# Patient Record
Sex: MALE | Race: WHITE | NOT HISPANIC OR LATINO | Employment: OTHER | URBAN - METROPOLITAN AREA
[De-identification: names, ages, dates, MRNs, and addresses within clinical notes are randomized per-mention and may not be internally consistent; named-entity substitution may affect disease eponyms.]

---

## 2017-01-26 RX ORDER — OXYMORPHONE HYDROCHLORIDE 40 MG/1
40 TABLET, FILM COATED, EXTENDED RELEASE ORAL EVERY 12 HOURS
Qty: 60 TABLET | Refills: 0 | Status: SHIPPED | OUTPATIENT
Start: 2017-01-26 | End: 2017-02-24 | Stop reason: SDUPTHER

## 2017-01-26 RX ORDER — OXYCODONE HYDROCHLORIDE 30 MG/1
30 TABLET ORAL EVERY 6 HOURS PRN
Qty: 120 TABLET | Refills: 0 | Status: SHIPPED | OUTPATIENT
Start: 2017-01-26 | End: 2017-02-24 | Stop reason: SDUPTHER

## 2017-02-23 ENCOUNTER — TELEPHONE (OUTPATIENT)
Dept: HEMATOLOGY/ONCOLOGY | Facility: CLINIC | Age: 57
End: 2017-02-23

## 2017-02-24 ENCOUNTER — LAB VISIT (OUTPATIENT)
Dept: LAB | Facility: HOSPITAL | Age: 57
End: 2017-02-24
Attending: INTERNAL MEDICINE
Payer: MEDICARE

## 2017-02-24 ENCOUNTER — OFFICE VISIT (OUTPATIENT)
Dept: HEMATOLOGY/ONCOLOGY | Facility: CLINIC | Age: 57
End: 2017-02-24
Payer: MEDICARE

## 2017-02-24 VITALS
BODY MASS INDEX: 20.53 KG/M2 | HEIGHT: 71 IN | SYSTOLIC BLOOD PRESSURE: 133 MMHG | OXYGEN SATURATION: 98 % | DIASTOLIC BLOOD PRESSURE: 80 MMHG | HEART RATE: 87 BPM | TEMPERATURE: 98 F | WEIGHT: 146.63 LBS

## 2017-02-24 DIAGNOSIS — C18.4 MALIGNANT NEOPLASM OF TRANSVERSE COLON: Primary | Chronic | ICD-10-CM

## 2017-02-24 DIAGNOSIS — J43.1 PANLOBULAR EMPHYSEMA: Chronic | ICD-10-CM

## 2017-02-24 DIAGNOSIS — Z72.0 TOBACCO ABUSE: Chronic | ICD-10-CM

## 2017-02-24 DIAGNOSIS — G89.4 CHRONIC PAIN SYNDROME: Chronic | ICD-10-CM

## 2017-02-24 DIAGNOSIS — F17.200 NEEDS SMOKING CESSATION EDUCATION: ICD-10-CM

## 2017-02-24 DIAGNOSIS — B18.2 CHRONIC HEPATITIS C WITHOUT HEPATIC COMA: Chronic | ICD-10-CM

## 2017-02-24 DIAGNOSIS — C18.4 MALIGNANT NEOPLASM OF TRANSVERSE COLON: ICD-10-CM

## 2017-02-24 DIAGNOSIS — F19.10 SUBSTANCE ABUSE: Chronic | ICD-10-CM

## 2017-02-24 LAB — CEA SERPL-MCNC: 1.4 NG/ML

## 2017-02-24 PROCEDURE — 36415 COLL VENOUS BLD VENIPUNCTURE: CPT | Mod: PO

## 2017-02-24 PROCEDURE — 99214 OFFICE O/P EST MOD 30 MIN: CPT | Mod: S$GLB,,, | Performed by: INTERNAL MEDICINE

## 2017-02-24 PROCEDURE — 82378 CARCINOEMBRYONIC ANTIGEN: CPT

## 2017-02-24 PROCEDURE — 99499 UNLISTED E&M SERVICE: CPT | Mod: S$GLB,,, | Performed by: INTERNAL MEDICINE

## 2017-02-24 PROCEDURE — 99999 PR PBB SHADOW E&M-EST. PATIENT-LVL III: CPT | Mod: PBBFAC,,, | Performed by: INTERNAL MEDICINE

## 2017-02-24 PROCEDURE — 1160F RVW MEDS BY RX/DR IN RCRD: CPT | Mod: S$GLB,,, | Performed by: INTERNAL MEDICINE

## 2017-02-24 RX ORDER — OXYCODONE HYDROCHLORIDE 30 MG/1
30 TABLET ORAL EVERY 6 HOURS PRN
Qty: 120 TABLET | Refills: 0 | Status: SHIPPED | OUTPATIENT
Start: 2017-02-24 | End: 2017-03-24 | Stop reason: SDUPTHER

## 2017-02-24 RX ORDER — OXYMORPHONE HYDROCHLORIDE 40 MG/1
40 TABLET, FILM COATED, EXTENDED RELEASE ORAL EVERY 12 HOURS
Qty: 60 TABLET | Refills: 0 | Status: SHIPPED | OUTPATIENT
Start: 2017-02-24 | End: 2017-03-24 | Stop reason: SDUPTHER

## 2017-02-24 NOTE — PROGRESS NOTES
Distress Screening Results: Psychosocial Distress screening score of Distress Score: 5 {AMB ONC DISTRESS SCORE:20276}

## 2017-02-24 NOTE — PROGRESS NOTES
Subjective:       Patient ID: Beltran Cruz is a 56 y.o. male.    Chief Complaint: Results and Colon Cancer    HPI 56-year-old male history of T T1 N0 colon carcinoma.  Follow-up heavy smoking history.    Past Medical History:   Diagnosis Date    Back pain     Cancer     Colon    Chronic hepatitis C     Chronic obstructive pulmonary disease 8/9/2016    Colon cancer 10/2016    T1 N0    Colon polyps     Colonoscopy 9/6/2016    Diverticulosis     Colonoscopy 9/6/2016    Drug overdose, intentional 03/22/2008    benzo,opiates, alcohol, asa , acetomenopjen    Hemorrhoids     Colonoscopy 9/6/2016    Screening PSA (prostate specific antigen) 8/24/2016     History reviewed. No pertinent family history.  Social History     Social History    Marital status:      Spouse name: N/A    Number of children: N/A    Years of education: N/A     Occupational History    Not on file.     Social History Main Topics    Smoking status: Current Every Day Smoker     Packs/day: 1.00     Years: 42.00     Types: Cigarettes    Smokeless tobacco: Never Used      Comment: no smoking after 12 midnight prior to surgery    Alcohol use No    Drug use: Yes     Special: IV      Comment: Opana IV    Sexual activity: Not on file     Other Topics Concern    Not on file     Social History Narrative     Past Surgical History:   Procedure Laterality Date    ANKLE SURGERY      APPENDECTOMY      BACK SURGERY      COLONOSCOPY N/A 9/6/2016    Procedure: COLONOSCOPY;  Surgeon: Jimbo Farrell MD;  Location: [facility];  Service: Endoscopy;  Laterality: N/A;    HEMICOLECTOMY  10/4/ 2016    laprascopic for colon ca    SHOULDER SURGERY         Labs:  Lab Results   Component Value Date    WBC 7.38 11/08/2016    HGB 11.7 (L) 11/08/2016    HCT 37.0 (L) 11/08/2016    MCV 85 11/08/2016     11/08/2016     BMP  Lab Results   Component Value Date     11/08/2016    K 3.8 11/08/2016     11/08/2016    CO2 27 11/08/2016     BUN 21 (H) 11/08/2016    CREATININE 0.8 11/08/2016    CREATININE 0.8 11/08/2016    CALCIUM 8.7 11/08/2016    ANIONGAP 6 (L) 11/08/2016    ESTGFRAFRICA >60.0 11/08/2016    ESTGFRAFRICA >60.0 11/08/2016    EGFRNONAA >60.0 11/08/2016    EGFRNONAA >60.0 11/08/2016     Lab Results   Component Value Date    ALT 31 11/08/2016    AST 25 11/08/2016    GGT 39 08/31/2016    ALKPHOS 69 11/08/2016    BILITOT 0.4 11/08/2016       No results found for: IRON, TIBC, FERRITIN, SATURATEDIRO  No results found for: DRYDNZBR30  No results found for: FOLATE  No results found for: TSH      Review of Systems   Constitutional: Negative for activity change, appetite change, chills, diaphoresis, fatigue, fever and unexpected weight change.   HENT: Negative for congestion, dental problem, drooling, ear discharge, ear pain, facial swelling, hearing loss, mouth sores, nosebleeds, postnasal drip, rhinorrhea, sinus pressure, sneezing, sore throat, tinnitus, trouble swallowing and voice change.    Eyes: Negative for photophobia, pain, discharge, redness, itching and visual disturbance.   Respiratory: Negative for apnea, cough, choking, chest tightness, shortness of breath, wheezing and stridor.    Cardiovascular: Negative for chest pain, palpitations and leg swelling.   Gastrointestinal: Negative for abdominal distention, abdominal pain, anal bleeding, blood in stool, constipation, diarrhea, nausea, rectal pain and vomiting.   Endocrine: Negative for cold intolerance, heat intolerance, polydipsia, polyphagia and polyuria.   Genitourinary: Negative for decreased urine volume, difficulty urinating, discharge, dysuria, enuresis, flank pain, frequency, genital sores, hematuria, penile pain, penile swelling, scrotal swelling, testicular pain and urgency.   Musculoskeletal: Negative for arthralgias, back pain, gait problem, joint swelling, myalgias, neck pain and neck stiffness.   Skin: Negative for color change, pallor, rash and wound.    Allergic/Immunologic: Negative for environmental allergies, food allergies and immunocompromised state.   Neurological: Negative for dizziness, tremors, seizures, syncope, facial asymmetry, speech difficulty, weakness, light-headedness, numbness and headaches.   Hematological: Negative for adenopathy. Does not bruise/bleed easily.   Psychiatric/Behavioral: Negative for agitation, behavioral problems, confusion, decreased concentration, dysphoric mood, hallucinations, self-injury, sleep disturbance and suicidal ideas. The patient is nervous/anxious. The patient is not hyperactive.        Objective:      Physical Exam   Constitutional: He is oriented to person, place, and time. He appears well-developed and well-nourished. No distress.   HENT:   Head: Normocephalic.   Right Ear: External ear normal.   Left Ear: External ear normal.   Nose: Nose normal. Right sinus exhibits no maxillary sinus tenderness and no frontal sinus tenderness. Left sinus exhibits no maxillary sinus tenderness and no frontal sinus tenderness.   Mouth/Throat: Oropharynx is clear and moist. No oropharyngeal exudate.   Eyes: EOM and lids are normal. Pupils are equal, round, and reactive to light. Right eye exhibits no discharge. Left eye exhibits no discharge. Right conjunctiva is not injected. Right conjunctiva has no hemorrhage. Left conjunctiva is not injected. Left conjunctiva has no hemorrhage. No scleral icterus. Right eye exhibits normal extraocular motion. Left eye exhibits normal extraocular motion.   Neck: Normal range of motion. Neck supple. No JVD present. No tracheal deviation present. No thyromegaly present.   Cardiovascular: Normal rate and regular rhythm.    Pulmonary/Chest: Effort normal. No stridor. No respiratory distress.   Abdominal: Soft. He exhibits no mass. There is no hepatosplenomegaly, splenomegaly or hepatomegaly. There is no tenderness.   Musculoskeletal: Normal range of motion. He exhibits no edema or tenderness.    Lymphadenopathy:        Head (right side): No posterior auricular and no occipital adenopathy present.        Head (left side): No posterior auricular and no occipital adenopathy present.     He has no cervical adenopathy.        Right cervical: No superficial cervical, no deep cervical and no posterior cervical adenopathy present.       Left cervical: No superficial cervical, no deep cervical and no posterior cervical adenopathy present.     He has no axillary adenopathy.        Right: No supraclavicular adenopathy present.        Left: No supraclavicular adenopathy present.   Neurological: He is alert and oriented to person, place, and time. He has normal strength. No cranial nerve deficit. Coordination normal.   Skin: Skin is dry. No rash noted. He is not diaphoretic. No cyanosis or erythema. Nails show no clubbing.   Psychiatric: He has a normal mood and affect. His behavior is normal. Judgment and thought content normal. Cognition and memory are normal.   Vitals reviewed.          Assessment:       1. Malignant neoplasm of transverse colon    2. Panlobular emphysema    3. Chronic hepatitis C without hepatic coma    4. Tobacco abuse    5. Needs smoking cessation education    6. Substance abuse    7. Chronic pain syndrome            Plan:         patient will have baseline CEA today and again in 3-4 months reviewed data from up-to-date on stage I disease demonstrated some efficacy in terms of follow-up testing will recommend full-length colonoscopy at one-year anniversary also will repeat CT chest in November 2017 heavy smoking history greater than 40-pack-year history strongly encouraged to quit smoking

## 2017-03-24 ENCOUNTER — NURSE TRIAGE (OUTPATIENT)
Dept: ADMINISTRATIVE | Facility: CLINIC | Age: 57
End: 2017-03-24

## 2017-03-24 ENCOUNTER — TELEPHONE (OUTPATIENT)
Dept: INTERNAL MEDICINE | Facility: CLINIC | Age: 57
End: 2017-03-24

## 2017-03-24 ENCOUNTER — TELEPHONE (OUTPATIENT)
Dept: ADMINISTRATIVE | Facility: CLINIC | Age: 57
End: 2017-03-24

## 2017-03-24 RX ORDER — OXYMORPHONE HYDROCHLORIDE 40 MG/1
40 TABLET, FILM COATED, EXTENDED RELEASE ORAL EVERY 12 HOURS
Qty: 60 TABLET | Refills: 0 | Status: SHIPPED | OUTPATIENT
Start: 2017-03-24 | End: 2017-03-24

## 2017-03-24 RX ORDER — OXYCODONE HYDROCHLORIDE 30 MG/1
30 TABLET ORAL EVERY 6 HOURS PRN
Qty: 120 TABLET | Refills: 0 | Status: SHIPPED | OUTPATIENT
Start: 2017-03-24 | End: 2017-04-21 | Stop reason: SDUPTHER

## 2017-03-24 RX ORDER — OXYMORPHONE HYDROCHLORIDE 40 MG/1
40 TABLET, FILM COATED, EXTENDED RELEASE ORAL EVERY 12 HOURS
Qty: 60 TABLET | Refills: 0 | Status: CANCELLED | OUTPATIENT
Start: 2017-03-24

## 2017-03-24 NOTE — TELEPHONE ENCOUNTER
----- Message from Maribel Floyd sent at 3/24/2017 10:16 AM CDT -----  Contact: pt   Pt needs a refill on oxycodine, and oxymphine,,, pt couldn't spell or pronounce name,, but it's two medication,,, pharmacy is the same,,, please call pt when done

## 2017-03-24 NOTE — TELEPHONE ENCOUNTER
----- Message from Opal Park sent at 3/24/2017  2:46 PM CDT -----  Contact: self [phone]  States that pharm sent over a request for a refill on oxymorphin. Please call back at [phone]//thank you acc

## 2017-03-24 NOTE — TELEPHONE ENCOUNTER
Reason for Disposition   Caller has already spoken with another triager and has no further questions.    Protocols used: ST NO CONTACT OR DUPLICATE CONTACT CALL-A-AH  duplicate message    Angie Tse RN     Spoke w/ pt & pharmacis--problem w/ oxyMORphone ER----prescription needs to not have OBANA ER on it anywhere/

## 2017-03-25 ENCOUNTER — NURSE TRIAGE (OUTPATIENT)
Dept: ADMINISTRATIVE | Facility: CLINIC | Age: 57
End: 2017-03-25

## 2017-03-25 ENCOUNTER — TELEPHONE (OUTPATIENT)
Dept: URGENT CARE | Facility: CLINIC | Age: 57
End: 2017-03-25

## 2017-03-25 NOTE — TELEPHONE ENCOUNTER
Reviewed w/ Dr Baldwin--he will try to escribe it that way but not sure if system will allow it to not have Obana on it    Pt notified that MD is working on it    Angie Tse RN  715pm--Dr Baldwin sent prescription to [facility] as pt and I had discussed/ after med escribed caller states she had called [facility] and med not in stock/ is asking if prescription has been sent and if not could it be sent to [facility]    Advised caller that med has been escribed and she will have to call clinic in AM for it to be sent elsewhere    Angie Tse RN

## 2017-03-25 NOTE — TELEPHONE ENCOUNTER
Spoke w pharm: oxymorp not avail at [facility] til Monday so cancelling this rx and sent a  New one to [facility]

## 2017-03-25 NOTE — TELEPHONE ENCOUNTER
Spoke with Dr. Baldwin provided pharmacy listed and alterative contact information states he will call pharmacy to attempt to transfer prescription.

## 2017-03-25 NOTE — TELEPHONE ENCOUNTER
Called patient per request of Dr. Baldwin, to inform him of his medicine being sent to the [facility] in [place] off of [address] as requested due to the [facility] in [place] not having the medicine available for him. No further questions at this time.

## 2017-03-25 NOTE — TELEPHONE ENCOUNTER
Called by [facility] on call/pharm insur will not cover opana er but they will cover oxymorph er; rx cannot have opana on it even if in parentheses(?!). erxd

## 2017-03-25 NOTE — TELEPHONE ENCOUNTER
Reason for Disposition   [1] Pain radiates into the thigh or further down the leg AND [2] both legs    Protocols used: ST BACK PAIN-A-AH  On-call provider paged for notification/advisement.

## 2017-03-27 NOTE — TELEPHONE ENCOUNTER
----- Message from Arelis Martinez sent at 3/27/2017  1:42 PM CDT -----  Contact: Yahaira/jean pierre  States he's calling regarding his medicine, he's been trying to get this straight since Friday. States he is hurting and he's out of medicine. Please call Yahaira at [phone]. Thank you

## 2017-03-27 NOTE — TELEPHONE ENCOUNTER
----- Message from Alyse Bourgeois sent at 3/27/2017 11:51 AM CDT -----  Contact: Patient   Refill request for Rx Oxymorphone to be sent to, Please call him at [phone].      [facility] [place] [place]  [address] [address] [address]  Phone: [phone] Fax: [phone]    Thanks  Td

## 2017-03-27 NOTE — TELEPHONE ENCOUNTER
Spoke with pt letting him know that I talked to Dr Lopez and he is sending in his Oxymorphone 40 mg  to [facility] and it is ready to be picked up. chio verbalized understanding

## 2017-03-27 NOTE — TELEPHONE ENCOUNTER
----- Message from Jonna Ariel sent at 3/27/2017  9:46 AM CDT -----  Patient calling to speak to nurse regarding Oxymorphone. States his pharmacy is stating they have not received his medication and he is completely out. States wants to know if he can come in the office and  the Rx. States it cannot have Opana on the Rx.    States requesting a call back as soon as possible.     Please adv/call [phone] and speak to nurse Yahaira.//thanks. terrence

## 2017-04-21 RX ORDER — OXYCODONE HYDROCHLORIDE 30 MG/1
30 TABLET ORAL EVERY 6 HOURS PRN
Qty: 120 TABLET | Refills: 0 | Status: SHIPPED | OUTPATIENT
Start: 2017-04-21 | End: 2017-05-22 | Stop reason: SDUPTHER

## 2017-04-21 NOTE — TELEPHONE ENCOUNTER
----- Message from Dayanara Lyon sent at 4/21/2017  9:33 AM CDT -----  Contact: pt  Pt state need a refill,pt will be out tomorrow.Rx-oxymorphone and oxycodone...[phone](please notify when sent)        .  [facility] [place] - [address] [address]  [place] [address]  Phone: [phone] Fax: [phone]

## 2017-05-19 RX ORDER — OXYCODONE HYDROCHLORIDE 30 MG/1
30 TABLET ORAL EVERY 6 HOURS PRN
Qty: 120 TABLET | Refills: 0 | OUTPATIENT
Start: 2017-05-19

## 2017-05-19 NOTE — TELEPHONE ENCOUNTER
----- Message from Judy Ribeiro sent at 5/19/2017  3:48 PM CDT -----  Contact: Pt  Pt is requesting to speak to the nurse regarding his medication. Pls call pt back at [phone].

## 2017-05-19 NOTE — TELEPHONE ENCOUNTER
Patient informed that he needs an appointment before medication can be refill. Scheduled appointment for Monday.

## 2017-05-19 NOTE — TELEPHONE ENCOUNTER
----- Message from Maribel Floyd sent at 5/19/2017 10:37 AM CDT -----  Contact: pt   Pt needs refills on oxycodine and oxymophine,, [facility],,, please call pt back at [phone]

## 2017-05-22 ENCOUNTER — OFFICE VISIT (OUTPATIENT)
Dept: GASTROENTEROLOGY | Facility: CLINIC | Age: 57
End: 2017-05-22
Payer: MEDICARE

## 2017-05-22 ENCOUNTER — OFFICE VISIT (OUTPATIENT)
Dept: INTERNAL MEDICINE | Facility: CLINIC | Age: 57
End: 2017-05-22
Payer: MEDICARE

## 2017-05-22 ENCOUNTER — HOSPITAL ENCOUNTER (OUTPATIENT)
Dept: RADIOLOGY | Facility: HOSPITAL | Age: 57
Discharge: HOME | End: 2017-05-22
Attending: FAMILY MEDICINE
Payer: MEDICARE

## 2017-05-22 VITALS
BODY MASS INDEX: 21.39 KG/M2 | WEIGHT: 152.75 LBS | HEART RATE: 79 BPM | DIASTOLIC BLOOD PRESSURE: 68 MMHG | SYSTOLIC BLOOD PRESSURE: 120 MMHG | HEIGHT: 71 IN

## 2017-05-22 VITALS
BODY MASS INDEX: 21.36 KG/M2 | TEMPERATURE: 95 F | HEART RATE: 69 BPM | HEIGHT: 71 IN | DIASTOLIC BLOOD PRESSURE: 60 MMHG | SYSTOLIC BLOOD PRESSURE: 118 MMHG | WEIGHT: 152.56 LBS

## 2017-05-22 DIAGNOSIS — M54.40 CHRONIC BILATERAL LOW BACK PAIN WITH SCIATICA, SCIATICA LATERALITY UNSPECIFIED: ICD-10-CM

## 2017-05-22 DIAGNOSIS — Z85.038 HISTORY OF COLON CANCER: ICD-10-CM

## 2017-05-22 DIAGNOSIS — G89.29 CHRONIC BILATERAL LOW BACK PAIN WITH SCIATICA, SCIATICA LATERALITY UNSPECIFIED: ICD-10-CM

## 2017-05-22 DIAGNOSIS — M50.30 DDD (DEGENERATIVE DISC DISEASE), CERVICAL: ICD-10-CM

## 2017-05-22 DIAGNOSIS — B18.2 CHRONIC HEPATITIS C WITHOUT HEPATIC COMA: Primary | ICD-10-CM

## 2017-05-22 DIAGNOSIS — Z00.00 ANNUAL PHYSICAL EXAM: Primary | ICD-10-CM

## 2017-05-22 DIAGNOSIS — K21.9 GASTROESOPHAGEAL REFLUX DISEASE WITHOUT ESOPHAGITIS: ICD-10-CM

## 2017-05-22 PROCEDURE — 99999 PR PBB SHADOW E&M-EST. PATIENT-LVL III: CPT | Mod: PBBFAC,,, | Performed by: PHYSICIAN ASSISTANT

## 2017-05-22 PROCEDURE — 99396 PREV VISIT EST AGE 40-64: CPT | Mod: S$GLB,,, | Performed by: FAMILY MEDICINE

## 2017-05-22 PROCEDURE — 72040 X-RAY EXAM NECK SPINE 2-3 VW: CPT | Mod: 26,,, | Performed by: RADIOLOGY

## 2017-05-22 PROCEDURE — 99499 UNLISTED E&M SERVICE: CPT | Mod: S$GLB,,, | Performed by: FAMILY MEDICINE

## 2017-05-22 PROCEDURE — 99214 OFFICE O/P EST MOD 30 MIN: CPT | Mod: S$GLB,,, | Performed by: PHYSICIAN ASSISTANT

## 2017-05-22 PROCEDURE — 72114 X-RAY EXAM L-S SPINE BENDING: CPT | Mod: 26,,, | Performed by: RADIOLOGY

## 2017-05-22 PROCEDURE — 1160F RVW MEDS BY RX/DR IN RCRD: CPT | Mod: S$GLB,,, | Performed by: PHYSICIAN ASSISTANT

## 2017-05-22 PROCEDURE — 99999 PR PBB SHADOW E&M-EST. PATIENT-LVL III: CPT | Mod: PBBFAC,,, | Performed by: FAMILY MEDICINE

## 2017-05-22 PROCEDURE — 72040 X-RAY EXAM NECK SPINE 2-3 VW: CPT | Mod: TC,PO

## 2017-05-22 PROCEDURE — 72114 X-RAY EXAM L-S SPINE BENDING: CPT | Mod: TC,PO

## 2017-05-22 PROCEDURE — 99499 UNLISTED E&M SERVICE: CPT | Mod: S$GLB,,, | Performed by: PHYSICIAN ASSISTANT

## 2017-05-22 RX ORDER — SODIUM, POTASSIUM,MAG SULFATES 17.5-3.13G
SOLUTION, RECONSTITUTED, ORAL ORAL
Qty: 354 ML | Refills: 0 | Status: SHIPPED | OUTPATIENT
Start: 2017-05-22 | End: 2017-07-10 | Stop reason: HOSPADM

## 2017-05-22 RX ORDER — OXYCODONE HYDROCHLORIDE 30 MG/1
30 TABLET ORAL EVERY 6 HOURS PRN
Qty: 120 TABLET | Refills: 0 | Status: SHIPPED | OUTPATIENT
Start: 2017-05-22 | End: 2017-06-20 | Stop reason: SDUPTHER

## 2017-05-22 NOTE — PROGRESS NOTES
Subjective:       Patient ID: Beltran Cruz is a 56 y.o. male.    Chief Complaint: Follow-up    HPI   The patient has a history of Hepatitis C and is here today for a follow up visit. The patient was last seen by Dr. Werner six months ago; he is new to me. The patient's genotype was 1a and viral load 63,000. He was treated with Harvoni for 12 weeks. He only took about about five days of it and stopped it because he was having nausea. He tried to come back in to discuss but lost his transportation.     The patient was also diagnosed with colon cancer. He had a colonoscopy in September and was found to have a malignant tumor at the splenic flexure. The patient was treated with left hemicolectomy. After his colonoscopy, a six month repeat was recommended. He has occasional abdominal pain. He attributes it to pain medications. He denies nausea or vomiting at this time. He reports heartburn especially at night. He has had Protonix, but isn't taken it. He says people take his medications. He has tried Zantac with some relief.     Review of Systems    As per HPI.     Objective:      Physical Exam   Constitutional: He is oriented to person, place, and time. He appears well-developed and well-nourished.   HENT:   Head: Normocephalic and atraumatic.   Cardiovascular: Normal rate and regular rhythm.    Pulmonary/Chest: Effort normal and breath sounds normal.   Abdominal: Soft. Bowel sounds are normal. He exhibits no distension. There is no tenderness.   Neurological: He is alert and oriented to person, place, and time.   Psychiatric: His behavior is normal.       Assessment:       1. Chronic hepatitis C without hepatic coma    2. History of colon cancer    3. Gastroesophageal reflux disease without esophagitis        Plan:       History of colon cancer -  Colonoscopy - I have explained the risks, benefits, and alternatives of the procedure(s) in detail. The patient voices understanding and all questions have been answered.  The patient agrees to proceed as planned.     HCV - We will need to talk with the pharmacy and see if he can get refills. He was told not to start the Harvoni at home until we can confirm refills. He has no evidence of advanced liver disease.     GERD - Recommend taking Protonix as prescribed or taking OTC Zantac 150 mg at bedtime.       Thank you for the opportunity to participate in the care of this patient. This consult was designated to me by my supervising physician. A report will be sent to the referring provider.   Jones Almeida PA-C.

## 2017-05-24 ENCOUNTER — TELEPHONE (OUTPATIENT)
Dept: GASTROENTEROLOGY | Facility: CLINIC | Age: 57
End: 2017-05-24

## 2017-05-24 RX ORDER — LEDIPASVIR AND SOFOSBUVIR 90; 400 MG/1; MG/1
1 TABLET, FILM COATED ORAL DAILY
Qty: 28 TABLET | Refills: 2 | Status: SHIPPED | OUTPATIENT
Start: 2017-05-24 | End: 2017-06-20 | Stop reason: SDUPTHER

## 2017-05-24 NOTE — TELEPHONE ENCOUNTER
Pharmacy said patient would need a new prescription for Harvoni. This will be sent to [facility].

## 2017-05-25 ENCOUNTER — TELEPHONE (OUTPATIENT)
Dept: PHARMACY | Facility: CLINIC | Age: 57
End: 2017-05-25

## 2017-05-29 NOTE — TELEPHONE ENCOUNTER
FOR DOCUMENTATION ONLY:  Su prior authorization approved x 12 weeks.  5/26/17 through 8/18/17  Case ID: [id number]  $8.25 co pay

## 2017-05-30 PROBLEM — Z85.038 HISTORY OF COLON CANCER: Status: ACTIVE | Noted: 2017-05-30

## 2017-05-30 PROBLEM — Z85.038 HISTORY OF COLON CANCER: Chronic | Status: ACTIVE | Noted: 2017-05-30

## 2017-05-31 NOTE — TELEPHONE ENCOUNTER
Reach out to patient regarding new specialty medication for Harvoni prior authroization approved x 12 weeks (patient have an opened bottle of Harvoni but took 5 tablets by in 2016 but d/c due to nausea per provider) and copay $8.25. Inform patient to look at the bottle of Harvoni and see when the medication  along with the exact count of medication. Patient scheduled for a phone consultation on  @2pm and ship medication out on  address confirmed and to send an invoice along with the medication. Patient voiced understanding.

## 2017-06-02 NOTE — TELEPHONE ENCOUNTER
Initial Harvoni 90/400mg consult completed on 17. Harvoni 90/400mg will be shipped on 17 to arrive at patient's home on 17 via [employer]. $8.25 copay - INVOICE. Patient will start Harvoni 90/400mg on 17. Address confirmed, signature requirement requested. Confirmed 2 patient identifiers - name and . Therapy Appropriate.  **Previously started harvoni 12/2016 x 5 days and stopped due to nausea. He thinks it was b/c he was so anxious. He will see PCP to f/u on anxiety. He has 1 bottle of Harvoni home with 21 remaining tablets that are good til the end of the year. Instructed to keep these in case provider would like him to finish them at the end of current treatment.    Harvoni- Take one tablet by mouth daily x 12 weeks  Counseling was reviewed:   1. Patient MUST take Harvoni at the SAME time every day.   2. Patient MUST avoid acid reducers without consulting with myself or provider first. Antacids are to be spaced out at least 4 hours apart from Harvoni. Patient takes protonix 40mg PRN - advised to stop Protonix and he agreed to using Tums/Rolaids PRN, making sure to space out from Harvoni at least 4 hours apart.   3. Side effects include headaches and fatigue.   Headache: Patient may treat with OTC remedies. If Tylenol is used, dose should  not exceed 2000mg per day.    DDI: Medication list reviewed and potential DDIs addressed. No DDIs or allergies noted. Patient MUST contact myself or provider prior to starting any new OTC, herbal, or prescription drugs to avoid potential DDIs.    He complains of fatigue, sporadic abdominal tenderness, muscle aches and pains. He suffers from anxiety and is pretty anxious about starting treatment. Assured him that it should be fine as long as he takes his medication correctly.    Discussed the importance of staying well hydrated while on therapy. Compliance stressed - patient to take missed doses as soon as remembered, but NOT to take 2 doses in one day. Patient will  report questions or concerns to myself or practitioner. Patient verbalizes understanding. Patient plans to start Harvoni on 6/7/17. Consultation included: indication; goals of treatment; administration; storage and handling; side effects; how to handle side effects; the importance of compliance; how to handle missed doses; the importance of laboratory monitoring; the importance of keeping all follow up appointments.  Patient understands to report any medication changes to [facility] and provider. All questions answered and addressed to patients satisfaction.  I will f/u with patient in 1 week from start, and [facility] will contact patient in 3 weeks to coordinate next refill.

## 2017-06-20 ENCOUNTER — TELEPHONE (OUTPATIENT)
Dept: INTERNAL MEDICINE | Facility: CLINIC | Age: 57
End: 2017-06-20

## 2017-06-20 NOTE — TELEPHONE ENCOUNTER
----- Message from Claudia Perdomo sent at 6/20/2017  1:26 PM CDT -----  Contact: Pt   Pt calling in regards to his medication would like a call back to his neice Yahaira Cruz to elaborate...[phone]

## 2017-06-20 NOTE — TELEPHONE ENCOUNTER
----- Message from Abby Jules sent at 6/20/2017 10:24 AM CDT -----  Contact: [phone]  Patient would like to consult with nurse regarding status of authorization for refill on pain medication. Please call back at [phone].    Thanks,  Abby Jules

## 2017-06-20 NOTE — TELEPHONE ENCOUNTER
----- Message from Abby Jules sent at 6/20/2017 10:24 AM CDT -----  Contact: [phone]  Patient would like to consult with nurse regarding status of authorization for refill on pain medication. Please call back at [phone].    Thanks,  Abby Jules

## 2017-06-21 RX ORDER — PANTOPRAZOLE SODIUM 40 MG/1
40 TABLET, DELAYED RELEASE ORAL DAILY
Qty: 30 TABLET | Refills: 11 | Status: SHIPPED | OUTPATIENT
Start: 2017-06-21 | End: 2019-05-13

## 2017-06-21 RX ORDER — LEDIPASVIR AND SOFOSBUVIR 90; 400 MG/1; MG/1
1 TABLET, FILM COATED ORAL DAILY
Qty: 28 TABLET | Refills: 2 | Status: SHIPPED | OUTPATIENT
Start: 2017-06-21 | End: 2019-05-13

## 2017-06-21 RX ORDER — OXYCODONE HYDROCHLORIDE 30 MG/1
30 TABLET ORAL EVERY 6 HOURS PRN
Qty: 120 TABLET | Refills: 0 | Status: SHIPPED | OUTPATIENT
Start: 2017-06-21 | End: 2017-07-17 | Stop reason: SDUPTHER

## 2017-06-21 NOTE — TELEPHONE ENCOUNTER
----- Message from Shawn Ferreira sent at 6/21/2017  5:28 PM CDT -----  Contact: Patient  Patient called in stating that the Doctor's office haven't sent in his prescription in yet and he's been trying to get in contact with someone in the office but no response. He was suppose to leave town yesterday but he couldn't because he didn't have his medication. Please call Patient ASAP [phone].

## 2017-06-21 NOTE — TELEPHONE ENCOUNTER
----- Message from Miriam Baptiste sent at 6/21/2017 11:10 AM CDT -----  Contact: Patient's jean pierre Yahaira Syed is returning a call, please call her back at [phone]. Thank you

## 2017-06-21 NOTE — TELEPHONE ENCOUNTER
----- Message from Nunu George sent at 6/21/2017 10:03 AM CDT -----  Contact: pt  The pt request a call concerning a medication refill, pt can be reached at [phone] ///[id number]

## 2017-06-21 NOTE — TELEPHONE ENCOUNTER
Spoke with both patient and his niece on yesterday and today.  Called both the patient and his niece to inform them that prescription was ready for .  No answer on either phone.

## 2017-06-21 NOTE — TELEPHONE ENCOUNTER
----- Message from Abby Jules sent at 6/21/2017  3:47 PM CDT -----  Contact: miguel-fiona [phone]  Would like to consult with nurse regarding remainder RX's for the patient. Please call back at [phone].    Pt uses:  [facility] [place] - [address] [address] [address]  Phone: [phone] Fax: [phone]    Thanks,  Abby Jules

## 2017-06-21 NOTE — TELEPHONE ENCOUNTER
----- Message from Oliva Wolf sent at 6/21/2017  8:57 AM CDT -----  Patient states that he called [facility] [phone] this morning and they did not receive a refill request from this doctor.  He wants to know if you could call it in this morning.  Call him at [phone].                                               porras

## 2017-06-21 NOTE — TELEPHONE ENCOUNTER
Called patient's niece again.  Was able to make contact.  She states that patient's phone battery .  They will come in the morning for medication

## 2017-06-22 ENCOUNTER — TELEPHONE (OUTPATIENT)
Dept: INTERNAL MEDICINE | Facility: CLINIC | Age: 57
End: 2017-06-22

## 2017-06-22 NOTE — TELEPHONE ENCOUNTER
----- Message from Krystina Muñoz sent at 6/22/2017  2:42 PM CDT -----  Contact: Yahaira/jean pierre  Please call Yahaira @ [phone] regarding pt medication, states one script was missing, pt pain medicaiton, states pt need a hard copy of script.

## 2017-06-26 ENCOUNTER — TELEPHONE (OUTPATIENT)
Dept: PHARMACY | Facility: CLINIC | Age: 57
End: 2017-06-26

## 2017-06-26 NOTE — TELEPHONE ENCOUNTER
Su refill completed. Copay$0 (004). Shipping 6/28/17 Signature Required. address confirmed.  F/U: No new questions or concerns. Stated he started about a week later then he wanted to, but has not missed a dose at all since starting. When first started had a mild sickness and thought it may be due to the medication. Continued to take medication through and it cleared itself and he feels much better at this time.

## 2017-07-10 ENCOUNTER — OFFICE VISIT (OUTPATIENT)
Dept: HEMATOLOGY/ONCOLOGY | Facility: CLINIC | Age: 57
End: 2017-07-10
Payer: MEDICARE

## 2017-07-10 ENCOUNTER — OFFICE VISIT (OUTPATIENT)
Dept: GASTROENTEROLOGY | Facility: CLINIC | Age: 57
End: 2017-07-10
Payer: MEDICARE

## 2017-07-10 ENCOUNTER — LAB VISIT (OUTPATIENT)
Dept: LAB | Facility: HOSPITAL | Age: 57
End: 2017-07-10
Attending: INTERNAL MEDICINE
Payer: MEDICARE

## 2017-07-10 VITALS
TEMPERATURE: 98 F | DIASTOLIC BLOOD PRESSURE: 75 MMHG | BODY MASS INDEX: 21.96 KG/M2 | OXYGEN SATURATION: 96 % | HEART RATE: 83 BPM | WEIGHT: 156.88 LBS | HEIGHT: 71 IN | SYSTOLIC BLOOD PRESSURE: 120 MMHG

## 2017-07-10 VITALS
WEIGHT: 154.31 LBS | BODY MASS INDEX: 21.6 KG/M2 | HEIGHT: 71 IN | HEART RATE: 79 BPM | SYSTOLIC BLOOD PRESSURE: 134 MMHG | DIASTOLIC BLOOD PRESSURE: 74 MMHG

## 2017-07-10 DIAGNOSIS — B18.2 CHRONIC HEPATITIS C WITHOUT HEPATIC COMA: Primary | ICD-10-CM

## 2017-07-10 DIAGNOSIS — Z85.038 HISTORY OF COLON CANCER: Chronic | ICD-10-CM

## 2017-07-10 DIAGNOSIS — T40.601A NARCOTIC BOWEL SYNDROME: ICD-10-CM

## 2017-07-10 DIAGNOSIS — J44.9 CHRONIC OBSTRUCTIVE PULMONARY DISEASE, UNSPECIFIED COPD TYPE: ICD-10-CM

## 2017-07-10 DIAGNOSIS — B18.2 CHRONIC HEPATITIS C WITHOUT HEPATIC COMA: ICD-10-CM

## 2017-07-10 DIAGNOSIS — C18.4 MALIGNANT NEOPLASM OF TRANSVERSE COLON: Primary | Chronic | ICD-10-CM

## 2017-07-10 DIAGNOSIS — Z85.038 HISTORY OF COLON CANCER: ICD-10-CM

## 2017-07-10 DIAGNOSIS — B18.2 CHRONIC HEPATITIS C WITHOUT HEPATIC COMA: Chronic | ICD-10-CM

## 2017-07-10 DIAGNOSIS — K21.9 GASTROESOPHAGEAL REFLUX DISEASE WITHOUT ESOPHAGITIS: ICD-10-CM

## 2017-07-10 DIAGNOSIS — C18.4 MALIGNANT NEOPLASM OF TRANSVERSE COLON: Chronic | ICD-10-CM

## 2017-07-10 DIAGNOSIS — K63.89 NARCOTIC BOWEL SYNDROME: ICD-10-CM

## 2017-07-10 LAB
ALBUMIN SERPL BCP-MCNC: 3.5 G/DL
ALP SERPL-CCNC: 84 U/L
ALT SERPL W/O P-5'-P-CCNC: 12 U/L
ANION GAP SERPL CALC-SCNC: 7 MMOL/L
AST SERPL-CCNC: 15 U/L
BILIRUB DIRECT SERPL-MCNC: 0.2 MG/DL
BILIRUB SERPL-MCNC: 0.6 MG/DL
BUN SERPL-MCNC: 26 MG/DL
CALCIUM SERPL-MCNC: 8.8 MG/DL
CEA SERPL-MCNC: 1.6 NG/ML
CHLORIDE SERPL-SCNC: 109 MMOL/L
CO2 SERPL-SCNC: 26 MMOL/L
CREAT SERPL-MCNC: 1 MG/DL
EST. GFR  (AFRICAN AMERICAN): >60 ML/MIN/1.73 M^2
EST. GFR  (NON AFRICAN AMERICAN): >60 ML/MIN/1.73 M^2
GLUCOSE SERPL-MCNC: 70 MG/DL
POTASSIUM SERPL-SCNC: 4.4 MMOL/L
PROT SERPL-MCNC: 8.3 G/DL
SODIUM SERPL-SCNC: 142 MMOL/L

## 2017-07-10 PROCEDURE — 99213 OFFICE O/P EST LOW 20 MIN: CPT | Mod: S$GLB,,, | Performed by: PHYSICIAN ASSISTANT

## 2017-07-10 PROCEDURE — 99999 PR PBB SHADOW E&M-EST. PATIENT-LVL III: CPT | Mod: PBBFAC,,, | Performed by: PHYSICIAN ASSISTANT

## 2017-07-10 PROCEDURE — 99499 UNLISTED E&M SERVICE: CPT | Mod: S$GLB,,, | Performed by: INTERNAL MEDICINE

## 2017-07-10 PROCEDURE — 99999 PR PBB SHADOW E&M-EST. PATIENT-LVL III: CPT | Mod: PBBFAC,,, | Performed by: INTERNAL MEDICINE

## 2017-07-10 PROCEDURE — 99214 OFFICE O/P EST MOD 30 MIN: CPT | Mod: S$GLB,,, | Performed by: INTERNAL MEDICINE

## 2017-07-10 PROCEDURE — 99499 UNLISTED E&M SERVICE: CPT | Mod: S$GLB,,, | Performed by: PHYSICIAN ASSISTANT

## 2017-07-10 NOTE — PROGRESS NOTES
Subjective:       Patient ID: Beltran Cruz is a 56 y.o. male.    Chief Complaint: Results and Colon Cancer    HPI 56-year-old male history of T1 N0 colon carcinoma continues to actively smoke patient is also being treated for hepatitis C with Harvoni through GI medicine patient returns for follow-up concerned about possibility of underlying malignancy    Past Medical History:   Diagnosis Date    Back pain     Cancer     Colon    Chronic hepatitis C     Chronic obstructive pulmonary disease 8/9/2016    Colon cancer 10/2016    T1 N0    Colon polyps     Colonoscopy 9/6/2016    Diverticulosis     Colonoscopy 9/6/2016    Drug overdose, intentional 03/22/2008    benzo,opiates, alcohol, asa , acetomenopjen    Hemorrhoids     Colonoscopy 9/6/2016    Screening PSA (prostate specific antigen) 8/24/2016     History reviewed. No pertinent family history.  Social History     Social History    Marital status:      Spouse name: N/A    Number of children: N/A    Years of education: N/A     Occupational History    Not on file.     Social History Main Topics    Smoking status: Current Every Day Smoker     Packs/day: 1.00     Years: 42.00     Types: Cigarettes    Smokeless tobacco: Current User      Comment: no smoking after 12 midnight prior to surgery    Alcohol use Yes    Drug use:      Types: IV      Comment: Opana IV    Sexual activity: Not on file     Other Topics Concern    Not on file     Social History Narrative    No narrative on file     Past Surgical History:   Procedure Laterality Date    ANKLE SURGERY      APPENDECTOMY      BACK SURGERY      COLONOSCOPY N/A 9/6/2016    Procedure: COLONOSCOPY;  Surgeon: Jimbo Farrell MD;  Location: [facility];  Service: Endoscopy;  Laterality: N/A;    HEMICOLECTOMY  10/4/ 2016    laprascopic for colon ca    SHOULDER SURGERY         Labs:  Lab Results   Component Value Date    WBC 7.38 11/08/2016    HGB 11.7 (L) 11/08/2016    HCT 37.0 (L)  11/08/2016    MCV 85 11/08/2016     11/08/2016     BMP  Lab Results   Component Value Date     11/08/2016    K 3.8 11/08/2016     11/08/2016    CO2 27 11/08/2016    BUN 21 (H) 11/08/2016    CREATININE 0.8 11/08/2016    CREATININE 0.8 11/08/2016    CALCIUM 8.7 11/08/2016    ANIONGAP 6 (L) 11/08/2016    ESTGFRAFRICA >60.0 11/08/2016    ESTGFRAFRICA >60.0 11/08/2016    EGFRNONAA >60.0 11/08/2016    EGFRNONAA >60.0 11/08/2016     Lab Results   Component Value Date     (H) 05/22/2017    AST 72 (H) 05/22/2017    GGT 39 08/31/2016    ALKPHOS 81 05/22/2017    BILITOT 0.5 05/22/2017       No results found for: IRON, TIBC, FERRITIN, SATURATEDIRO  No results found for: FXKPTRBM16  No results found for: FOLATE  No results found for: TSH      Review of Systems   Constitutional: Positive for fatigue. Negative for activity change, appetite change, chills, diaphoresis, fever and unexpected weight change.   HENT: Negative for congestion, dental problem, drooling, ear discharge, ear pain, facial swelling, hearing loss, mouth sores, nosebleeds, postnasal drip, rhinorrhea, sinus pressure, sneezing, sore throat, tinnitus, trouble swallowing and voice change.    Eyes: Negative for photophobia, pain, discharge, redness, itching and visual disturbance.   Respiratory: Negative for apnea, cough, choking, chest tightness, shortness of breath, wheezing and stridor.    Cardiovascular: Negative for chest pain, palpitations and leg swelling.   Gastrointestinal: Negative for abdominal distention, abdominal pain, anal bleeding, blood in stool, constipation, diarrhea, nausea, rectal pain and vomiting.   Endocrine: Negative for cold intolerance, heat intolerance, polydipsia, polyphagia and polyuria.   Genitourinary: Negative for decreased urine volume, difficulty urinating, discharge, dysuria, enuresis, flank pain, frequency, genital sores, hematuria, penile pain, penile swelling, scrotal swelling, testicular pain and  urgency.   Musculoskeletal: Negative for arthralgias, back pain, gait problem, joint swelling, myalgias, neck pain and neck stiffness.   Skin: Negative for color change, pallor, rash and wound.   Allergic/Immunologic: Negative for environmental allergies, food allergies and immunocompromised state.   Neurological: Positive for weakness. Negative for dizziness, tremors, seizures, syncope, facial asymmetry, speech difficulty, light-headedness, numbness and headaches.   Hematological: Negative for adenopathy. Does not bruise/bleed easily.   Psychiatric/Behavioral: Positive for dysphoric mood. Negative for agitation, behavioral problems, confusion, decreased concentration, hallucinations, self-injury, sleep disturbance and suicidal ideas. The patient is nervous/anxious. The patient is not hyperactive.        Objective:      Physical Exam   Constitutional: He is oriented to person, place, and time. He appears well-developed and well-nourished. He appears distressed.   HENT:   Head: Normocephalic.   Right Ear: External ear normal.   Left Ear: External ear normal.   Nose: Nose normal. Right sinus exhibits no maxillary sinus tenderness and no frontal sinus tenderness. Left sinus exhibits no maxillary sinus tenderness and no frontal sinus tenderness.   Mouth/Throat: Oropharynx is clear and moist. No oropharyngeal exudate.   Eyes: EOM and lids are normal. Pupils are equal, round, and reactive to light. Right eye exhibits no discharge. Left eye exhibits no discharge. Right conjunctiva is not injected. Right conjunctiva has no hemorrhage. Left conjunctiva is not injected. Left conjunctiva has no hemorrhage. No scleral icterus. Right eye exhibits normal extraocular motion. Left eye exhibits normal extraocular motion.   Neck: Normal range of motion. Neck supple. No JVD present. No tracheal deviation present. No thyromegaly present.   Cardiovascular: Normal rate and regular rhythm.    Pulmonary/Chest: Effort normal. No stridor. No  respiratory distress.   Abdominal: Soft. He exhibits no mass. There is no hepatosplenomegaly, splenomegaly or hepatomegaly. There is no tenderness.   Musculoskeletal: Normal range of motion. He exhibits no edema or tenderness.   Lymphadenopathy:        Head (right side): No posterior auricular and no occipital adenopathy present.        Head (left side): No posterior auricular and no occipital adenopathy present.     He has no cervical adenopathy.        Right cervical: No superficial cervical, no deep cervical and no posterior cervical adenopathy present.       Left cervical: No superficial cervical, no deep cervical and no posterior cervical adenopathy present.     He has no axillary adenopathy.        Right: No supraclavicular adenopathy present.        Left: No supraclavicular adenopathy present.   Neurological: He is alert and oriented to person, place, and time. He has normal strength. No cranial nerve deficit. Coordination normal.   Skin: Skin is dry. No rash noted. He is not diaphoretic. No cyanosis or erythema. Nails show no clubbing.   Psychiatric: He has a normal mood and affect. His behavior is normal. Judgment and thought content normal. Cognition and memory are normal.   Vitals reviewed.          Assessment:      1. Malignant neoplasm of transverse colon    2. History of colon cancer    3. Chronic hepatitis C without hepatic coma           Plan:   Results of laboratory results pending at this point low risk of recurrence will recommend repeat imaging in November 2017 chest abdomen pelvis because of history of tobacco use he's had a number of friends die of malignancies very concerned about her reassuring him encouraged him to quit smoking offered tobacco cessation program to him continue treatment through GI medicine for hepatitis C

## 2017-07-10 NOTE — PROGRESS NOTES
Subjective:       Patient ID: Beltran Cruz is a 56 y.o. male.    Chief Complaint: Follow-up    HPI   The patient has a history of hepatitis C, colon cancer and GERD. He is here today for a follow up visit. Patient on Harvoni. He started in early June and will be treated for 12 weeks. He is tolerating treatment well. He complains of chronic pain. He says he has gained weight which he is pleased about. He says he is taking Protonix and his GERD is better controlled. Last visit, a colonoscopy was ordered but the patient hasn't done it yet.He said too much was going on in his life. He also said he can't tolerate suprep. It caused nausea and vomiting. He is having some issues currently with constipation. He is on narcotics daily for chronic pain.     Review of Systems    As per HPI. He also reports trouble with his breathing. He is taking medications more than prescribed.     Objective:      Physical Exam   Constitutional: He is oriented to person, place, and time. He appears well-developed and well-nourished.   HENT:   Head: Normocephalic and atraumatic.   Cardiovascular: Normal rate and regular rhythm.    Pulmonary/Chest: Effort normal and breath sounds normal. No respiratory distress.   Abdominal: Soft. Bowel sounds are normal. He exhibits no distension. There is tenderness in the right lower quadrant.   Musculoskeletal: He exhibits no edema.   Neurological: He is alert and oriented to person, place, and time. No cranial nerve deficit.   Psychiatric: His behavior is normal.         Assessment:       1. Chronic hepatitis C without hepatic coma    2. History of colon cancer    3. Gastroesophageal reflux disease without esophagitis    4. Narcotic bowel syndrome    5. Chronic obstructive pulmonary disease, unspecified COPD type        Plan:         HCV - Continue Harvoni as prescribed. Repeat HCV RNA end of August and then 3 months after (Nov.)     Constipation - Start Miralax once daily for chronic constipation. If  this doesn't help, we may need to consider something prescription like Movantik.     History of Colon cancer - Reschedule colonoscopy and will use a Miralax prep. Keep scheduled appt with Dr. Avendano today. Add LFT to his labs today.     GERD - Continue Protonix daily.     COPD - Schedule an appt with Pulmonary at [facility].     Thank you for the opportunity to participate in the care of this patient.   Jones Almeida PA-C.

## 2017-07-11 ENCOUNTER — TELEPHONE (OUTPATIENT)
Dept: HEMATOLOGY/ONCOLOGY | Facility: CLINIC | Age: 57
End: 2017-07-11

## 2017-07-11 NOTE — TELEPHONE ENCOUNTER
Gave brother marker tumor message/it looks good per Dr Avendano. He will tell his Beltran Bye for us.

## 2017-07-17 ENCOUNTER — TELEPHONE (OUTPATIENT)
Dept: GASTROENTEROLOGY | Facility: CLINIC | Age: 57
End: 2017-07-17

## 2017-07-17 ENCOUNTER — SURGERY (OUTPATIENT)
Age: 57
End: 2017-07-17

## 2017-07-17 ENCOUNTER — ANESTHESIA EVENT (OUTPATIENT)
Dept: ENDOSCOPY | Facility: HOSPITAL | Age: 57
End: 2017-07-17
Payer: MEDICARE

## 2017-07-17 ENCOUNTER — ANESTHESIA (OUTPATIENT)
Dept: ENDOSCOPY | Facility: HOSPITAL | Age: 57
End: 2017-07-17
Payer: MEDICARE

## 2017-07-17 ENCOUNTER — HOSPITAL ENCOUNTER (OUTPATIENT)
Facility: HOSPITAL | Age: 57
Discharge: HOME | End: 2017-07-17
Attending: INTERNAL MEDICINE | Admitting: INTERNAL MEDICINE
Payer: MEDICARE

## 2017-07-17 VITALS
TEMPERATURE: 98 F | BODY MASS INDEX: 21.56 KG/M2 | HEIGHT: 71 IN | HEART RATE: 68 BPM | OXYGEN SATURATION: 99 % | WEIGHT: 154 LBS | SYSTOLIC BLOOD PRESSURE: 96 MMHG | DIASTOLIC BLOOD PRESSURE: 60 MMHG | RESPIRATION RATE: 16 BRPM

## 2017-07-17 VITALS — RESPIRATION RATE: 13 BRPM

## 2017-07-17 DIAGNOSIS — Z85.038 HISTORY OF COLON CANCER: ICD-10-CM

## 2017-07-17 PROCEDURE — 37000008 HC ANESTHESIA 1ST 15 MINUTES: Performed by: INTERNAL MEDICINE

## 2017-07-17 PROCEDURE — 27201012 HC FORCEPS, HOT/COLD, DISP: Performed by: INTERNAL MEDICINE

## 2017-07-17 PROCEDURE — 88305 TISSUE EXAM BY PATHOLOGIST: CPT | Performed by: PATHOLOGY

## 2017-07-17 PROCEDURE — 25000003 PHARM REV CODE 250: Performed by: INTERNAL MEDICINE

## 2017-07-17 PROCEDURE — 63600175 PHARM REV CODE 636 W HCPCS: Performed by: NURSE ANESTHETIST, CERTIFIED REGISTERED

## 2017-07-17 PROCEDURE — 25000003 PHARM REV CODE 250: Performed by: FAMILY MEDICINE

## 2017-07-17 PROCEDURE — 45380 COLONOSCOPY AND BIOPSY: CPT | Mod: PT,,, | Performed by: INTERNAL MEDICINE

## 2017-07-17 PROCEDURE — 37000009 HC ANESTHESIA EA ADD 15 MINS: Performed by: INTERNAL MEDICINE

## 2017-07-17 PROCEDURE — 88305 TISSUE EXAM BY PATHOLOGIST: CPT | Mod: 26,,, | Performed by: PATHOLOGY

## 2017-07-17 PROCEDURE — 25000003 PHARM REV CODE 250: Performed by: NURSE ANESTHETIST, CERTIFIED REGISTERED

## 2017-07-17 PROCEDURE — 45380 COLONOSCOPY AND BIOPSY: CPT | Performed by: INTERNAL MEDICINE

## 2017-07-17 RX ORDER — SODIUM CHLORIDE, SODIUM LACTATE, POTASSIUM CHLORIDE, CALCIUM CHLORIDE 600; 310; 30; 20 MG/100ML; MG/100ML; MG/100ML; MG/100ML
INJECTION, SOLUTION INTRAVENOUS CONTINUOUS
Status: DISCONTINUED | OUTPATIENT
Start: 2017-07-17 | End: 2017-07-17 | Stop reason: HOSPADM

## 2017-07-17 RX ORDER — PROPOFOL 10 MG/ML
VIAL (ML) INTRAVENOUS
Status: DISCONTINUED | OUTPATIENT
Start: 2017-07-17 | End: 2017-07-17

## 2017-07-17 RX ORDER — OXYCODONE HYDROCHLORIDE 30 MG/1
30 TABLET ORAL EVERY 6 HOURS PRN
Qty: 120 TABLET | Refills: 0 | Status: SHIPPED | OUTPATIENT
Start: 2017-07-17 | End: 2017-08-17 | Stop reason: SDUPTHER

## 2017-07-17 RX ORDER — LIDOCAINE HYDROCHLORIDE 10 MG/ML
INJECTION INFILTRATION; PERINEURAL
Status: DISCONTINUED | OUTPATIENT
Start: 2017-07-17 | End: 2017-07-17

## 2017-07-17 RX ADMIN — PROPOFOL 50 MG: 10 INJECTION, EMULSION INTRAVENOUS at 03:07

## 2017-07-17 RX ADMIN — LIDOCAINE HYDROCHLORIDE 50 MG: 10 INJECTION, SOLUTION INFILTRATION; PERINEURAL at 03:07

## 2017-07-17 RX ADMIN — SODIUM CHLORIDE, SODIUM LACTATE, POTASSIUM CHLORIDE, AND CALCIUM CHLORIDE: 600; 310; 30; 20 INJECTION, SOLUTION INTRAVENOUS at 03:07

## 2017-07-17 RX ADMIN — SODIUM CHLORIDE, SODIUM LACTATE, POTASSIUM CHLORIDE, AND CALCIUM CHLORIDE: .6; .31; .03; .02 INJECTION, SOLUTION INTRAVENOUS at 01:07

## 2017-07-17 RX ADMIN — PROPOFOL 20 MG: 10 INJECTION, EMULSION INTRAVENOUS at 03:07

## 2017-07-17 NOTE — H&P (VIEW-ONLY)
Subjective:       Patient ID: Beltran Cruz is a 56 y.o. male.    Chief Complaint: Follow-up    HPI   The patient has a history of hepatitis C, colon cancer and GERD. He is here today for a follow up visit. Patient on Harvoni. He started in early June and will be treated for 12 weeks. He is tolerating treatment well. He complains of chronic pain. He says he has gained weight which he is pleased about. He says he is taking Protonix and his GERD is better controlled. Last visit, a colonoscopy was ordered but the patient hasn't done it yet.He said too much was going on in his life. He also said he can't tolerate suprep. It caused nausea and vomiting. He is having some issues currently with constipation. He is on narcotics daily for chronic pain.     Review of Systems    As per HPI. He also reports trouble with his breathing. He is taking medications more than prescribed.     Objective:      Physical Exam   Constitutional: He is oriented to person, place, and time. He appears well-developed and well-nourished.   HENT:   Head: Normocephalic and atraumatic.   Cardiovascular: Normal rate and regular rhythm.    Pulmonary/Chest: Effort normal and breath sounds normal. No respiratory distress.   Abdominal: Soft. Bowel sounds are normal. He exhibits no distension. There is tenderness in the right lower quadrant.   Musculoskeletal: He exhibits no edema.   Neurological: He is alert and oriented to person, place, and time. No cranial nerve deficit.   Psychiatric: His behavior is normal.         Assessment:       1. Chronic hepatitis C without hepatic coma    2. History of colon cancer    3. Gastroesophageal reflux disease without esophagitis    4. Narcotic bowel syndrome    5. Chronic obstructive pulmonary disease, unspecified COPD type        Plan:         HCV - Continue Harvoni as prescribed. Repeat HCV RNA end of August and then 3 months after (Nov.)     Constipation - Start Miralax once daily for chronic constipation. If  this doesn't help, we may need to consider something prescription like Movantik.     History of Colon cancer - Reschedule colonoscopy and will use a Miralax prep. Keep scheduled appt with Dr. Avendano today. Add LFT to his labs today.     GERD - Continue Protonix daily.     COPD - Schedule an appt with Pulmonary at [facility].     Thank you for the opportunity to participate in the care of this patient.   Jones Almeida PA-C.

## 2017-07-17 NOTE — INTERVAL H&P NOTE
The patient has been examined and the H&P has been reviewed:    I concur with the findings and no changes have occurred since H&P was written.    Anesthesia/Surgery risks, benefits and alternative options discussed and understood by patient/family.          Active Hospital Problems    Diagnosis  POA    *History of colon cancer [Z85.038]  Yes     Chronic      Resolved Hospital Problems    Diagnosis Date Resolved POA   No resolved problems to display.

## 2017-07-17 NOTE — TELEPHONE ENCOUNTER
----- Message from Joanie Brand sent at 7/17/2017  4:26 PM CDT -----  Contact: Pt  Pt needs to speak with nurse regarding meds. Please give pt a call at ..[phone] ([email or web address])

## 2017-07-17 NOTE — TELEPHONE ENCOUNTER
Pt calling to inform office that Suprep not working. I instructed him to drink 2 or more 16 oz containers of water with each bottle of Suprep. He verbalized understanding.

## 2017-07-17 NOTE — ANESTHESIA RELEASE NOTE
"Anesthesia Release from PACU Note    Patient: Beltran Cruz    Procedure(s) Performed: Procedure(s) (LRB):  COLONOSCOPY (N/A)    Anesthesia type: MAC    Post pain: Adequate analgesia    Post assessment: no apparent anesthetic complications, tolerated procedure well and no evidence of recall    Last Vitals:   Visit Vitals  BP 96/60   Pulse 68   Temp 36.4 °C (97.6 °F)   Resp 16   Ht 5' 11" (1.803 m)   Wt 69.9 kg (154 lb)   SpO2 99%   BMI 21.48 kg/m²       Post vital signs: stable    Level of consciousness: awake, alert  and oriented    Nausea/Vomiting: no nausea/no vomiting    Complications: none    Airway Patency: patent    Respiratory: unassisted, spontaneous ventilation, room air    Cardiovascular: stable and blood pressure at baseline    Hydration: euvolemic  "

## 2017-07-17 NOTE — TRANSFER OF CARE
"Anesthesia Transfer of Care Note    Patient: Beltran Cruz    Procedure(s) Performed: Procedure(s) (LRB):  COLONOSCOPY (N/A)    Patient location: GI    Anesthesia Type: MAC    Transport from OR: Transported from OR on room air with adequate spontaneous ventilation    Post pain: adequate analgesia    Post assessment: no apparent anesthetic complications    Post vital signs: stable    Level of consciousness: awake, alert and oriented    Nausea/Vomiting: no nausea/vomiting    Complications: none    Transfer of care protocol was followed      Last vitals:   Visit Vitals  /72 (BP Location: Left arm, BP Method: Automatic)   Pulse 73   Temp 36.4 °C (97.6 °F) (Oral)   Resp 14   Ht 5' 11" (1.803 m)   Wt 69.9 kg (154 lb)   SpO2 96%   BMI 21.48 kg/m²     "

## 2017-07-17 NOTE — ANESTHESIA PREPROCEDURE EVALUATION
07/17/2017  Beltran Cruz is a 56 y.o., male.    Anesthesia Evaluation    I have reviewed the Patient Summary Reports.    I have reviewed the Nursing Notes.   I have reviewed the Medications.     Review of Systems  Anesthesia Hx:  No problems with previous Anesthesia    Social:  Smoker    Cardiovascular:   ECG has been reviewed. CONCLUSIONS     1 - Normal left ventricular systolic function (EF 60-65%).     2 - Normal left ventricular diastolic function.     3 - Normal right ventricular systolic function .   Pulmonary:   COPD  Chronic Obstructive Pulmonary Disease (COPD): Inhaler use is inhaled steroid use currently and rescue inhaler PRN.    Hepatic/GI:   Bowel Prep. GERD Liver Disease, Hepatitis, C  Liver Disease, Hepatitis, chronic        Physical Exam  General:  Well nourished    Airway/Jaw/Neck:  Airway Findings: Mouth Opening: Normal Tongue: Normal  General Airway Assessment: Adult       Chest/Lungs:  Chest/Lungs Findings: Normal Respiratory Rate     Heart/Vascular:  Heart Findings: Rate: Normal             Anesthesia Plan  Type of Anesthesia, risks & benefits discussed:  Anesthesia Type:  MAC  Patient's Preference:   Intra-op Monitoring Plan:   Intra-op Monitoring Plan Comments:   Post Op Pain Control Plan:   Post Op Pain Control Plan Comments:   Induction:   IV  Beta Blocker:  Patient is not currently on a Beta-Blocker (No further documentation required).       Informed Consent: Patient understands risks and agrees with Anesthesia plan.  Questions answered. Anesthesia consent signed with patient.  ASA Score: 3     Day of Surgery Review of History & Physical: I have interviewed and examined the patient. I have reviewed the patient's H&P dated:  There are no significant changes.          Ready For Surgery From Anesthesia Perspective.

## 2017-07-17 NOTE — DISCHARGE SUMMARY
[facility]  Brief Operative Note     SUMMARY     Surgery Date: 7/17/2017     Surgeon(s) and Role:     * Jimbo Farrell MD - Primary    Assisting Surgeon: None    Pre-op Diagnosis:  History of colon cancer [Z85.038]    Post-op Diagnosis:  Post-Op Diagnosis Codes:     * History of colon cancer [Z85.038]    Procedure(s) (LRB):  COLONOSCOPY (N/A)    Anesthesia: Choice    Description of the findings of the procedure: Procedure completed. See Procedure note for details.     Findings/Key Components: Procedure completed. See Procedure note for details.     Prosthesis/Implants: None    Estimated Blood Loss: less than 10         Specimens:   Specimen (12h ago through future)    Start     Ordered    07/17/17 1533  Specimen to Pathology - Surgery  Once     Comments:  #1 Colon polyp      07/17/17 1538          Discharge Note    SUMMARY     Admit Date: 7/17/2017    Discharge Date and Time:  07/17/2017 3:41 PM    Hospital Course (synopsis of major diagnoses, care, treatment, and services provided during the course of the hospital stay): Procedure completed. See Procedure note for details.      Final Diagnosis: Post-Op Diagnosis Codes:     * History of colon cancer [Z85.038]    Disposition: Home or Self Care    Follow Up/Patient Instructions:     Medications:  Reconciled Home Medications:   Current Discharge Medication List      CONTINUE these medications which have NOT CHANGED    Details   fluticasone-salmeterol 250-50 mcg/dose (ADVAIR) 250-50 mcg/dose diskus inhaler Inhale 1 puff into the lungs 2 (two) times daily. 1 inhaler  Qty: 1 each, Refills: 4      ledipasvir-sofosbuvir  mg Tab Take 1 tablet by mouth once daily.  Qty: 28 tablet, Refills: 2      oxycodone (ROXICODONE) 30 MG Tab Take 1 tablet (30 mg total) by mouth every 6 (six) hours as needed.  Qty: 120 tablet, Refills: 0      oxyMORphone 40 mg TR12 Take 1 tablet by mouth every 12 (twelve) hours.  Qty: 60 each, Refills: 0      pantoprazole  (PROTONIX) 40 MG tablet Take 1 tablet (40 mg total) by mouth once daily.  Qty: 30 tablet, Refills: 11      tamsulosin (FLOMAX) 0.4 mg Cp24 0.4 mg once daily.       VENTOLIN HFA 90 mcg/actuation inhaler every 6 (six) hours as needed.              Discharge Procedure Orders  Diet general     Activity as tolerated       Follow-up Information     Parvez Lopez MD.    Specialty:  Family Medicine  Contact information:  [address] [place] [address] [phone]

## 2017-07-17 NOTE — ANESTHESIA POSTPROCEDURE EVALUATION
"Anesthesia Post Evaluation    Patient: Beltran Cruz    Procedure(s) Performed: Procedure(s) (LRB):  COLONOSCOPY (N/A)    Final Anesthesia Type: MAC  Patient location during evaluation: GI PACU  Patient participation: Yes- Able to Participate  Level of consciousness: awake and alert and oriented  Post-procedure vital signs: reviewed and stable  Pain management: adequate  Airway patency: patent  PONV status at discharge: No PONV  Anesthetic complications: no      Cardiovascular status: blood pressure returned to baseline  Respiratory status: unassisted, room air and spontaneous ventilation  Hydration status: euvolemic  Follow-up not needed.        Visit Vitals  BP 96/60   Pulse 68   Temp 36.4 °C (97.6 °F)   Resp 16   Ht 5' 11" (1.803 m)   Wt 69.9 kg (154 lb)   SpO2 99%   BMI 21.48 kg/m²       Pain/Chloé Score: Pain Assessment Performed: Yes (7/17/2017  1:34 PM)  Presence of Pain: denies (7/17/2017  4:08 PM)  Chloé Score: 10 (7/17/2017  4:07 PM)      "

## 2017-07-17 NOTE — DISCHARGE INSTRUCTIONS
Hemorrhoids    Hemorrhoids are swollen and inflamed veins inside the rectum and near the anus. The rectum is the last several inches of the colon. The anus is the passage between the rectum and the outside of the body.  Causes  The veins can become swollen due to increased pressure in them. This is most often caused by:  · Chronic constipation or diarrhea  · Straining when having a bowel movement  · Sitting too long on the toilet  · A low-fiber diet  · Pregnancy  Symptoms  · Bleeding from the rectum (this may be noticeable after bowel movements)  · Lump near the anus  · Itching around the anus  · Pain around the anus  There are different types of hemorrhoids. Depending on the type you have and the severity, you may be able to treat yourself at home. In some cases, a procedure may be the best treatment option. Your healthcare provider can tell you more about this, if needed.  Home care  General care  · To get relief from pain or itching, try:  ¨ Topical products. Your healthcare provider may prescribe or recommend creams, ointments, or pads that can be applied to the hemorrhoid. Use these exactly as directed.  ¨ Medicines. Your healthcare provider may recommend stool softeners, suppositories, or laxatives to help manage constipation. Use these exactly as directed.  ¨ Sitz baths. A sitz bath involves sitting in a few inches of warm bath water. Be careful not to make the water so hot that you burn yourself--test it before sitting in it. Soak for about 10 to 15 minutes a few times a day. This may help relieve pain.  Tips to help prevent hemorrhoids  · Eat more fiber. Fiber adds bulk to stool and absorbs water as it moves through your colon. This makes stool softer and easier to pass.  ¨ Increase the fiber in your diet with more fiber-rich foods. These include fresh fruit, vegetables, and whole grains.  ¨ Take a fiber supplement or bulking agent, if advised to by your provider. These include products such as psyllium  or methylcellulose.  · Drink plenty of water, if directed to by your provider. This can help keep stool soft.  · Be more active. Frequent exercise aids digestion and helps prevent constipation. It may also help make bowel movements more regular.  · Dont strain during bowel movements. This can make hemorrhoids more likely. Also, dont sit on the toilet for long periods of time.  Follow-up care  Follow up with your healthcare provider, or as advised. If a culture or imaging tests were done, you will be notified of the results when they are ready. This may take a few days or longer.  When to seek medical advice  Call your healthcare provider right away if any of these occur:  · Increased bleeding from the rectum  · Increased pain around the rectum or anus  · Weakness or dizziness  Call 911  Call 911 or return to the emergency department right away if any of these occur:  · Trouble breathing or swallowing  · Fainting or loss of consciousness  · Unusually fast heart rate  · Vomiting blood  · Large amounts of blood in stool  Date Last Reviewed: 6/22/2015 © 2000-2016 [employer]. [address] [place] [address]. All rights reserved. This information is not intended as a substitute for professional medical care. Always follow your healthcare professional's instructions.        Diverticulosis    Diverticulosis means that small pouches have formed in the wall of your large intestine (colon). Most often, this problem causes no symptoms and is common as people age. But the pouches in the colon are at risk of becoming infected. When this happens, the condition is called diverticulitis. Although most people with diverticulosis never develop diverticulitis, it is still not uncommon. Rectal bleeding can also occur and in less common situations, a type of colon inflammation called colitis.  While most people do not have symptoms, some people with diverticulosis may have:  · Abdominal cramps and  pain  · Bloating  · Constipation  · Change in bowel habits  Causes  The exact cause of diverticulosis (and diverticulitis) has not been proved, but a few things are associated with the condition:  · Low-fiber diet  · Constipation  · Lack of exercise  Your healthcare provider will talk with you about how to manage your condition. Diet changes may be all that are needed to help control diverticulosis and prevent progression to diverticulitis. If you develop diverticulitis, you will likely need other treatments.  Home care  You may be told to take fiber supplements daily. Fiber adds bulk to the stool so that it passes through the colon more easily. Stool softeners may be recommended. You may also be given medications for pain relief. Be sure to take all medications as directed.  In the past, people were told to avoid corn, nuts, and seeds. This is no longer necessary.  Follow these guidelines when caring for yourself at home:  · Eat unprocessed foods that are high in fiber. Whole grains, fruits, and vegetables are good choices.  · Drink 6 to 8 glasses of water every day unless your healthcare provider has you limit how much fluid you should have.  · Watch for changes in your bowel movements. Tell your provider if you notice any changes.  · Begin an exercise program. Ask your provider how to get started. Generally, walking is the best.  · Get plenty of rest and sleep.  Follow-up care  Follow up with your healthcare provider, or as advised. Regular visits may be needed to check on your health. Sometimes special procedures such as colonoscopy, are needed after an episode of diverticulitis or blooding. Be sure to keep all your appointments.  If a stool sample was taken, or cultures were done, you should be told if they are positive, or if your treatment needs to be changed. You can call as directed for the results.  If X-rays were done, a radiologist will look at them. You will be told if there is a change in your  treatment.  If antibiotics were prescribed, be sure to finish them all.  When to seek medical advice  Call your healthcare provider right away if any of these occur:  · Fever of 100.4°F (38°C) or higher, or as directed by your healthcare provider  · Severe cramps in the lower left side of the abdomen or pain that is getting worse  · Tenderness in the lower left side of the abdomen or worsening pain throughout the abdomen  · Diarrhea or constipation that doesn't get better within 24 hours  · Nausea and vomiting  · Bleeding from the rectum  Call 911  Call emergency services if any of the following occur:  · Trouble breathing  · Confusion  · Very drowsy or trouble awakening  · Fainting or loss of consciousness  · Rapid heart rate  · Chest pain  Date Last Reviewed: 12/30/2015 © 2000-2016 [employer]. [address] [place] [address], [place] [address]. All rights reserved. This information is not intended as a substitute for professional medical care. Always follow your healthcare professional's instructions.        Understanding Colon and Rectal Polyps    The colon (also called the large intestine) is a muscular tube that forms the last part of the digestive tract. It absorbs water and stores food waste. The colon is about 4 to 6 feet long. The rectum is the last 6 inches of the colon. The colon and rectum have a smooth lining composed of millions of cells. Changes in these cells can lead to growths in the colon that can become cancerous and should be removed. Multiple tests are available to screen for colon cancer, but the colonoscopy is the most recommended test. During colonoscopy, these polyps can be removed. How often you need this test depends on many things including your condition, your family history, symptoms, and what the findings were at the previous colonoscopy.   When the colon lining changes  Changes that happen in the cells that line the colon or rectum can lead to growths called polyps. Over a  period of years, polyps can turn cancerous. Removing polyps early may prevent cancer from ever forming.  Polyps  Polyps are fleshy clumps of tissue that form on the lining of the colon or rectum. Small polyps are usually benign (not cancerous). However, over time, cells in a polyp can change and become cancerous. Certain types of polyps known as adenomatous polyps are premalignant. The risk for invasive cancer increases with the size of the polyp and certain cell and gene features. This means that they can become cancerous if they're not removed. Hyperplastic polyps are benign. They can grow quite large and not turn cancerous.   Cancer  Almost all colorectal cancers start when polyp cells begin growing abnormally. As a cancerous tumor grows, it may involve more and more of the colon or rectum. In time, cancer can also grow beyond the colon or rectum and spread to nearby organs or to glands called lymph nodes. The cells can also travel to other parts of the body. This is known as metastasis. The earlier a cancerous tumor is removed, the better the chance of preventing its spread.    Date Last Reviewed: 8/1/2016  © 5056-4824 [employer]. [address] [place] [address], [place] [address]. All rights reserved. This information is not intended as a substitute for professional medical care. Always follow your healthcare professional's instructions.

## 2017-07-20 ENCOUNTER — TELEPHONE (OUTPATIENT)
Dept: GASTROENTEROLOGY | Facility: HOSPITAL | Age: 57
End: 2017-07-20

## 2017-07-20 NOTE — TELEPHONE ENCOUNTER
Called to inform polyp was benign (adenomatous). Repeat colonoscopy in 3 years. Unable to leave . Will call again.

## 2017-07-20 NOTE — TELEPHONE ENCOUNTER
Please let the patient know that the polyp was benign (adenomatous). Repeat colonoscopy in 3 years. Thanks.

## 2017-07-24 ENCOUNTER — TELEPHONE (OUTPATIENT)
Dept: GASTROENTEROLOGY | Facility: CLINIC | Age: 57
End: 2017-07-24

## 2017-07-24 ENCOUNTER — TELEPHONE (OUTPATIENT)
Dept: PHARMACY | Facility: CLINIC | Age: 57
End: 2017-07-24

## 2017-07-24 NOTE — TELEPHONE ENCOUNTER
Informed polyp was benign (adenomatous). Repeat colonoscopy in 3 years. He verbalized understanding.

## 2017-07-27 ENCOUNTER — TELEPHONE (OUTPATIENT)
Dept: PHARMACY | Facility: CLINIC | Age: 57
End: 2017-07-27

## 2017-08-14 ENCOUNTER — TELEPHONE (OUTPATIENT)
Dept: PULMONOLOGY | Facility: CLINIC | Age: 57
End: 2017-08-14

## 2017-08-14 DIAGNOSIS — J44.9 CHRONIC OBSTRUCTIVE PULMONARY DISEASE, UNSPECIFIED COPD TYPE: Primary | ICD-10-CM

## 2017-08-17 DIAGNOSIS — G89.4 CHRONIC PAIN SYNDROME: Primary | Chronic | ICD-10-CM

## 2017-08-17 DIAGNOSIS — C18.4 MALIGNANT NEOPLASM OF TRANSVERSE COLON: Chronic | ICD-10-CM

## 2017-08-17 RX ORDER — OXYCODONE HYDROCHLORIDE 30 MG/1
30 TABLET ORAL EVERY 6 HOURS PRN
Qty: 120 TABLET | Refills: 0 | Status: SHIPPED | OUTPATIENT
Start: 2017-08-17 | End: 2017-09-15 | Stop reason: SDUPTHER

## 2017-08-17 NOTE — TELEPHONE ENCOUNTER
----- Message from Nunu George sent at 8/17/2017  4:28 PM CDT -----  Contact: pt  The pt request a call concerning a pain management referral, pt can be reached at [phone]///thxMW

## 2017-08-21 ENCOUNTER — OFFICE VISIT (OUTPATIENT)
Dept: INTERNAL MEDICINE | Facility: CLINIC | Age: 57
End: 2017-08-21
Payer: MEDICARE

## 2017-08-21 VITALS
HEIGHT: 71 IN | DIASTOLIC BLOOD PRESSURE: 72 MMHG | HEART RATE: 82 BPM | SYSTOLIC BLOOD PRESSURE: 112 MMHG | TEMPERATURE: 97 F | BODY MASS INDEX: 21.36 KG/M2 | WEIGHT: 152.56 LBS | OXYGEN SATURATION: 95 %

## 2017-08-21 DIAGNOSIS — B18.2 CHRONIC HEPATITIS C WITHOUT HEPATIC COMA: Chronic | ICD-10-CM

## 2017-08-21 DIAGNOSIS — G89.4 CHRONIC PAIN SYNDROME: Primary | Chronic | ICD-10-CM

## 2017-08-21 DIAGNOSIS — Z85.038 HISTORY OF COLON CANCER: Chronic | ICD-10-CM

## 2017-08-21 PROCEDURE — 3008F BODY MASS INDEX DOCD: CPT | Mod: S$GLB,,, | Performed by: FAMILY MEDICINE

## 2017-08-21 PROCEDURE — 99213 OFFICE O/P EST LOW 20 MIN: CPT | Mod: S$GLB,,, | Performed by: FAMILY MEDICINE

## 2017-08-21 PROCEDURE — 99499 UNLISTED E&M SERVICE: CPT | Mod: S$GLB,,, | Performed by: FAMILY MEDICINE

## 2017-08-21 PROCEDURE — 99999 PR PBB SHADOW E&M-EST. PATIENT-LVL III: CPT | Mod: PBBFAC,,, | Performed by: FAMILY MEDICINE

## 2017-08-21 RX ORDER — OXYMORPHONE HYDROCHLORIDE 40 MG/1
2 TABLET, FILM COATED, EXTENDED RELEASE ORAL 2 TIMES DAILY
Refills: 0 | COMMUNITY
Start: 2017-07-19 | End: 2017-10-17

## 2017-08-21 NOTE — PROGRESS NOTES
Subjective:       Patient ID: Beltran Cruz is a 57 y.o. male.    Chief Complaint: Follow-up (medication)    F/U:      Discussed with pt his pain management issues. Will start to wean pt off oxycodone. Pt was placed on these medications for lower back and GI surgery.      Review of Systems   Constitutional: Negative.    Respiratory: Negative.    Cardiovascular: Negative.    Genitourinary: Negative.    Musculoskeletal: Positive for back pain, neck pain and neck stiffness.   Neurological: Negative.        Objective:      Physical Exam   Constitutional: He is oriented to person, place, and time. He appears well-developed and well-nourished.   Cardiovascular: Normal rate and regular rhythm.  Exam reveals no friction rub.    No murmur heard.  Pulmonary/Chest: Effort normal and breath sounds normal. He has no wheezes.   Abdominal: He exhibits no mass. There is no guarding.   Musculoskeletal: Normal range of motion.   Neurological: He is alert and oriented to person, place, and time.   Skin: Skin is warm and dry.       Assessment:       1. Chronic pain syndrome    2. Chronic hepatitis C without hepatic coma        Plan:       Chronic pain syndrome  Comments:  Pt is on oxymorphine and oxycodine. Will start weaning pt down on oxycodine 1/2 tab three times a day    Chronic hepatitis C without hepatic coma  Comments:  Pt has chronic Hep C and is being followed by GI

## 2017-08-22 ENCOUNTER — TELEPHONE (OUTPATIENT)
Dept: INTERNAL MEDICINE | Facility: CLINIC | Age: 57
End: 2017-08-22

## 2017-08-22 NOTE — TELEPHONE ENCOUNTER
[facility] does not accept Medicare insurance. Referral being faxed to [facility] at [phone].

## 2017-08-22 NOTE — TELEPHONE ENCOUNTER
----- Message from Caryn Zacarias sent at 8/22/2017  1:21 PM CDT -----  Contact: Jerel with [facility]   Calling in reference to patient insurance provider. States they do not take Medicare or Medicaid. If patient would be interested in self pay they will be happy to provide the service. Please call Jerel @ [phone]. Thanks, latricia

## 2017-08-22 NOTE — TELEPHONE ENCOUNTER
----- Message from Arelis Martinez sent at 8/22/2017  4:22 PM CDT -----  Contact: Reilly  States she needs to speak to Meghna regarding a referral. Please call pt at [phone] or fax# [phone]. Thank you

## 2017-08-22 NOTE — TELEPHONE ENCOUNTER
Vani Almeida calling to see when patient would like to schedule appointment. Informed her that I was unsure that she would have to call the patient to find out.

## 2017-09-01 ENCOUNTER — TELEPHONE (OUTPATIENT)
Dept: PHARMACY | Facility: CLINIC | Age: 57
End: 2017-09-01

## 2017-09-01 ENCOUNTER — DOCUMENTATION ONLY (OUTPATIENT)
Dept: GASTROENTEROLOGY | Facility: CLINIC | Age: 57
End: 2017-09-01

## 2017-09-01 NOTE — PROGRESS NOTES
This patient is about to finish HCV treatment. Looks like repeat labs are scheduled for three months. He may need a repeat lab now.

## 2017-09-01 NOTE — TELEPHONE ENCOUNTER
Ms Almeida,      Our records indicate Mr Cruz has completed Harvoni treatment or should be any day now.    Will he have end of treatment labs completed?    Ryland Costello, PharmD, [facility]  [facility]  [phone]

## 2017-09-05 NOTE — PROGRESS NOTES
Spoke with patient and he states he has been sick and that is why he missed his last lab appointment.  He states that he should be better by the end of the week so his labs are scheduled for then at the [facility] location.

## 2017-09-15 RX ORDER — OXYCODONE HYDROCHLORIDE 30 MG/1
30 TABLET ORAL EVERY 6 HOURS PRN
Qty: 120 TABLET | Refills: 0 | Status: SHIPPED | OUTPATIENT
Start: 2017-09-15 | End: 2017-10-25 | Stop reason: SDUPTHER

## 2017-09-15 NOTE — TELEPHONE ENCOUNTER
----- Message from Joanie Brand sent at 9/15/2017  8:26 AM CDT -----  Contact: Pt  Pt needs to speak with nurse regarding a refill on his pain medication. Please give pt a call at ..[phone] (home)       .  [facility] [place] - [address] [address] [address]  Phone: [phone] Fax: [phone]

## 2017-10-12 ENCOUNTER — TELEPHONE (OUTPATIENT)
Dept: PHARMACY | Facility: CLINIC | Age: 57
End: 2017-10-12

## 2017-10-16 ENCOUNTER — NURSE TRIAGE (OUTPATIENT)
Dept: ADMINISTRATIVE | Facility: CLINIC | Age: 57
End: 2017-10-16

## 2017-10-16 NOTE — TELEPHONE ENCOUNTER
----- Message from Claudia Perdomo sent at 10/16/2017  2:12 PM CDT -----  Contact: Pt   Pt called to check the status of message that was sent over earlier  regarding refill callback number is [phone]

## 2017-10-16 NOTE — TELEPHONE ENCOUNTER
"  Reason for Disposition   Caller requesting a NON-URGENT new prescription or refill and triager unable to refill per unit policy    Answer Assessment - Initial Assessment Questions  1. SYMPTOMS: "Do you have any symptoms?"     Severe pain  2. SEVERITY: If symptoms are present, ask "Are they mild, moderate or severe?"      severe    Protocols used: ST MEDICATION QUESTION CALL-A-AH    "

## 2017-10-16 NOTE — TELEPHONE ENCOUNTER
----- Message from Miriam Lyns sent at 10/16/2017  8:23 AM CDT -----  Contact: Patient  1. What is the name of the medication you are requesting? Opana  2. What is the dose? 40mg  3. How do you take the medication? Orally, topically, etc? Orally  4. How often do you take this medication? 4 times daily  5. Do you need a 30 day or 90 day supply?30  6. How many refills are you requesting?  7. What is your preferred pharmacy and location of the pharmacy? [facility]  8. Who can we contact with further questions? Patient at [phone]

## 2017-10-17 ENCOUNTER — TELEPHONE (OUTPATIENT)
Dept: INTERNAL MEDICINE | Facility: CLINIC | Age: 57
End: 2017-10-17

## 2017-10-17 RX ORDER — OXYMORPHONE HYDROCHLORIDE 30 MG/1
30 TABLET, FILM COATED, EXTENDED RELEASE ORAL EVERY 12 HOURS
Qty: 60 TABLET | Refills: 0 | Status: SHIPPED | OUTPATIENT
Start: 2017-10-17 | End: 2017-12-26

## 2017-10-17 NOTE — TELEPHONE ENCOUNTER
----- Message from Joanie Brand sent at 10/17/2017 10:58 AM CDT -----  Contact: Pt  Pt was returning the nurse call. Please give pt a call at ..[phone] (zuvz)

## 2017-10-17 NOTE — TELEPHONE ENCOUNTER
----- Message from Ana Hernandez MA sent at 10/17/2017  7:07 AM CDT -----  Contact: Pt's daughter Anna  Unable to send traditional way  ----- Message -----  From: Carmina Baxter  Sent: 10/16/2017   5:33 PM  To: John DAVIS Staff    Pt's daughter is calling in regards to a refill on medication(oxyMORphone (OPANA ER) 40 MG 12 hr tablet). Pt states he is out of this medication.    Pt's daughter can be reached at  [phone].    Thank you

## 2017-10-17 NOTE — TELEPHONE ENCOUNTER
----- Message from Carmina Baxter sent at 10/16/2017  5:33 PM CDT -----  Contact: Pt's daughter Anna  Pt's daughter is calling in regards to a refill on medication(oxyMORphone (OPANA ER) 40 MG 12 hr tablet). Pt states he is out of this medication.    Pt's daughter can be reached at  [phone].    Thank you

## 2017-10-17 NOTE — TELEPHONE ENCOUNTER
----- Message from Erika Floyd sent at 10/17/2017 10:55 AM CDT -----  Contact: pt   Calling in regards to a missed call. [phone] (dbiy)

## 2017-10-25 RX ORDER — OXYCODONE HYDROCHLORIDE 30 MG/1
30 TABLET ORAL EVERY 8 HOURS PRN
Qty: 90 TABLET | Refills: 0 | Status: SHIPPED | OUTPATIENT
Start: 2017-10-25 | End: 2017-11-24

## 2017-10-25 NOTE — TELEPHONE ENCOUNTER
----- Message from Nelly Bourgeois sent at 10/25/2017  8:22 AM CDT -----  Contact: pt   Pt states that he need to talk to nurse. Pt didn't want to state the reason for the call. pt can be reached at [phone].

## 2017-10-25 NOTE — TELEPHONE ENCOUNTER
----- Message from Nelly Bourgeois sent at 10/25/2017  8:22 AM CDT -----  Contact: pt   Pt states that he need to talk to nurse. Pt didn't want to state the reason for the call. pt can be reached at [phone].

## 2017-11-02 ENCOUNTER — HOSPITAL ENCOUNTER (EMERGENCY)
Facility: HOSPITAL | Age: 57
Discharge: HOME | End: 2017-11-02
Attending: EMERGENCY MEDICINE
Payer: MEDICARE

## 2017-11-02 VITALS
RESPIRATION RATE: 21 BRPM | SYSTOLIC BLOOD PRESSURE: 113 MMHG | TEMPERATURE: 98 F | DIASTOLIC BLOOD PRESSURE: 70 MMHG | WEIGHT: 150 LBS | OXYGEN SATURATION: 100 % | HEART RATE: 78 BPM | HEIGHT: 71 IN | BODY MASS INDEX: 21 KG/M2

## 2017-11-02 DIAGNOSIS — J44.1 COPD EXACERBATION: Primary | ICD-10-CM

## 2017-11-02 DIAGNOSIS — F17.200 SMOKER: ICD-10-CM

## 2017-11-02 LAB
ALBUMIN SERPL BCP-MCNC: 3.8 G/DL
ALP SERPL-CCNC: 82 U/L
ALT SERPL W/O P-5'-P-CCNC: 66 U/L
ANION GAP SERPL CALC-SCNC: 8 MMOL/L
AST SERPL-CCNC: 36 U/L
BASOPHILS # BLD AUTO: 0.03 K/UL
BASOPHILS NFR BLD: 0.3 %
BILIRUB SERPL-MCNC: 0.7 MG/DL
BNP SERPL-MCNC: 23 PG/ML
BUN SERPL-MCNC: 19 MG/DL
CALCIUM SERPL-MCNC: 9.5 MG/DL
CHLORIDE SERPL-SCNC: 105 MMOL/L
CO2 SERPL-SCNC: 29 MMOL/L
CREAT SERPL-MCNC: 1.9 MG/DL
DIFFERENTIAL METHOD: ABNORMAL
EOSINOPHIL # BLD AUTO: 0.1 K/UL
EOSINOPHIL NFR BLD: 1.2 %
ERYTHROCYTE [DISTWIDTH] IN BLOOD BY AUTOMATED COUNT: 15.2 %
EST. GFR  (AFRICAN AMERICAN): 44 ML/MIN/1.73 M^2
EST. GFR  (NON AFRICAN AMERICAN): 38 ML/MIN/1.73 M^2
GLUCOSE SERPL-MCNC: 92 MG/DL
HCT VFR BLD AUTO: 37.4 %
HGB BLD-MCNC: 12.6 G/DL
LYMPHOCYTES # BLD AUTO: 2.7 K/UL
LYMPHOCYTES NFR BLD: 27.5 %
MCH RBC QN AUTO: 28.2 PG
MCHC RBC AUTO-ENTMCNC: 33.7 G/DL
MCV RBC AUTO: 84 FL
MONOCYTES # BLD AUTO: 0.7 K/UL
MONOCYTES NFR BLD: 7.2 %
NEUTROPHILS # BLD AUTO: 6.2 K/UL
NEUTROPHILS NFR BLD: 63.8 %
PLATELET # BLD AUTO: 266 K/UL
PMV BLD AUTO: 9.6 FL
POTASSIUM SERPL-SCNC: 3.4 MMOL/L
PROT SERPL-MCNC: 8 G/DL
RBC # BLD AUTO: 4.47 M/UL
SODIUM SERPL-SCNC: 142 MMOL/L
TROPONIN I SERPL DL<=0.01 NG/ML-MCNC: 0.01 NG/ML
WBC # BLD AUTO: 9.64 K/UL

## 2017-11-02 PROCEDURE — 80053 COMPREHEN METABOLIC PANEL: CPT

## 2017-11-02 PROCEDURE — 84484 ASSAY OF TROPONIN QUANT: CPT

## 2017-11-02 PROCEDURE — 93010 ELECTROCARDIOGRAM REPORT: CPT | Mod: ,,, | Performed by: INTERNAL MEDICINE

## 2017-11-02 PROCEDURE — 85025 COMPLETE CBC W/AUTO DIFF WBC: CPT

## 2017-11-02 PROCEDURE — 94640 AIRWAY INHALATION TREATMENT: CPT

## 2017-11-02 PROCEDURE — 99285 EMERGENCY DEPT VISIT HI MDM: CPT | Mod: 25

## 2017-11-02 PROCEDURE — 83880 ASSAY OF NATRIURETIC PEPTIDE: CPT

## 2017-11-02 PROCEDURE — 93005 ELECTROCARDIOGRAM TRACING: CPT

## 2017-11-02 PROCEDURE — 25000242 PHARM REV CODE 250 ALT 637 W/ HCPCS: Performed by: EMERGENCY MEDICINE

## 2017-11-02 RX ORDER — IPRATROPIUM BROMIDE AND ALBUTEROL SULFATE 2.5; .5 MG/3ML; MG/3ML
3 SOLUTION RESPIRATORY (INHALATION)
Status: COMPLETED | OUTPATIENT
Start: 2017-11-02 | End: 2017-11-02

## 2017-11-02 RX ORDER — ALBUTEROL SULFATE 1.25 MG/3ML
2.5 SOLUTION RESPIRATORY (INHALATION) EVERY 6 HOURS PRN
Qty: 25 ML | Refills: 0 | Status: SHIPPED | OUTPATIENT
Start: 2017-11-02 | End: 2017-11-27

## 2017-11-02 RX ORDER — ALBUTEROL SULFATE 90 UG/1
1-2 AEROSOL, METERED RESPIRATORY (INHALATION) EVERY 6 HOURS PRN
Qty: 1 INHALER | Refills: 0 | Status: SHIPPED | OUTPATIENT
Start: 2017-11-02 | End: 2019-05-13

## 2017-11-02 RX ADMIN — IPRATROPIUM BROMIDE AND ALBUTEROL SULFATE 3 ML: .5; 3 SOLUTION RESPIRATORY (INHALATION) at 07:11

## 2017-11-02 NOTE — ED PROVIDER NOTES
SCRIBE #1 NOTE: I, Obi Mendez, am scribing for, and in the presence of, Mesfin Gonzalez Jr., MD. I have scribed the entire note.      History      Chief Complaint   Patient presents with    Medication Reaction     pt took his normal pain medication.  Feeling better and aa0x4 after 1mg narcan       Review of patient's allergies indicates:   Allergen Reactions    Penicillins         HPI   HPI    11/2/2017, 6:50 PM   History obtained from the brother and patient      History of Present Illness: Beltran Cruz is a 57 y.o. male patient, with Hx of COPD, who presents to the Emergency Department for SOB which onset gradually 3 weeks ago. Symptoms are intermittent and moderate in severity. Pt states he got short of breath after exerting himself moving furniture. Pt thinks he just over exerted himself. Per brother, the pt has been having trouble catching his breath the past 3 weeks. No mitigating or exacerbating factors reported. No associated sx reported. Patient denies any fever, chills, n/v/d, CP, leg swelling, palpitations, cough, HA, lightheadedness, dizziness, and all other sxs at this time.No further complaints or concerns at this time.         Arrival mode: AASI    PCP: Parvez Lopez MD       Past Medical History:  Past Medical History:   Diagnosis Date    Back pain     Cancer     Colon    Chronic hepatitis C     Chronic obstructive pulmonary disease 8/9/2016    Colon cancer 10/2016    T1 N0    Colon polyps     Colonoscopy 9/6/2016    Diverticulosis     Colonoscopy 9/6/2016    Drug overdose, intentional 03/22/2008    benzo,opiates, alcohol, asa , acetomenopjen    Hemorrhoids     Colonoscopy 9/6/2016    Screening PSA (prostate specific antigen) 8/24/2016       Past Surgical History:  Past Surgical History:   Procedure Laterality Date    ANKLE SURGERY      APPENDECTOMY      BACK SURGERY      COLONOSCOPY N/A 9/6/2016    Procedure: COLONOSCOPY;  Surgeon: Jimbo Farrell MD;  Location: [facility];   Service: Endoscopy;  Laterality: N/A;    COLONOSCOPY N/A 7/17/2017    Procedure: COLONOSCOPY;  Surgeon: Jimbo Farrell MD;  Location: [facility];  Service: Endoscopy;  Laterality: N/A;    HEMICOLECTOMY  10/4/ 2016    laprascopic for colon ca    SHOULDER SURGERY           Family History:  Unknown    Social History:  Social History     Social History Main Topics    Smoking status: Current Every Day Smoker     Packs/day: 1.00     Years: 42.00     Types: Cigarettes    Smokeless tobacco: Never Used      Comment: no smoking after 12 midnight prior to surgery    Alcohol use Yes      Comment: social    Drug use:      Types: IV      Comment: Opana IV    Sexual activity: Unknown       ROS   Review of Systems   Constitutional: Negative for chills and fever.   HENT: Negative for sore throat.    Respiratory: Positive for shortness of breath. Negative for cough.    Cardiovascular: Negative for chest pain, palpitations and leg swelling.   Gastrointestinal: Negative for diarrhea, nausea and vomiting.   Genitourinary: Negative for dysuria.   Musculoskeletal: Negative for back pain.   Skin: Negative for rash.   Neurological: Negative for dizziness, weakness, light-headedness, numbness and headaches.   Hematological: Does not bruise/bleed easily.       Physical Exam      Initial Vitals [11/02/17 1822]   BP Pulse Resp Temp SpO2   118/74 76 18 98.2 °F (36.8 °C) 95 %      MAP       88.67          Physical Exam  Nursing Notes and Vital Signs Reviewed.  Constitutional: Patient is in no acute distress. Well-developed and well-nourished.  Head: Atraumatic. Normocephalic.  Eyes: PERRL. EOM intact. Conjunctivae are not pale. No scleral icterus.  ENT: Mucous membranes are moist. Oropharynx is clear and symmetric.    Neck: Supple. Full ROM. No lymphadenopathy.  Cardiovascular: Regular rate. Regular rhythm. No murmurs, rubs, or gallops. Distal pulses are 2+ and symmetric.  Pulmonary/Chest: No respiratory distress. Clear to  "auscultation bilaterally. No wheezing, rales, or rhonchi.  Abdominal: Soft and non-distended.  There is no tenderness.  No rebound, guarding, or rigidity.   Musculoskeletal: Moves all extremities. No obvious deformities. No edema.   Skin: Warm and dry.  Neurological:  Alert, awake, and appropriate.  Normal speech.  No acute focal neurological deficits are appreciated.  Psychiatric: Normal affect. Good eye contact. Appropriate in content.    ED Course    Procedures  ED Vital Signs:  Vitals:    11/02/17 1822 11/02/17 1904   BP: 118/74    Pulse: 76 76   Resp: 18 20   Temp: 98.2 °F (36.8 °C)    TempSrc: Oral    SpO2: 95%    Weight: 68 kg (150 lb)    Height: 5' 11" (1.803 m)        Abnormal Lab Results:  Labs Reviewed   CBC W/ AUTO DIFFERENTIAL - Abnormal; Notable for the following:        Result Value    RBC 4.47 (*)     Hemoglobin 12.6 (*)     Hematocrit 37.4 (*)     RDW 15.2 (*)     All other components within normal limits   COMPREHENSIVE METABOLIC PANEL - Abnormal; Notable for the following:     Potassium 3.4 (*)     Creatinine 1.9 (*)     ALT 66 (*)     eGFR if  44 (*)     eGFR if non  38 (*)     All other components within normal limits   TROPONIN I   B-TYPE NATRIURETIC PEPTIDE        All Lab Results:  Results for orders placed or performed during the hospital encounter of 11/02/17   CBC auto differential   Result Value Ref Range    WBC 9.64 3.90 - 12.70 K/uL    RBC 4.47 (L) 4.60 - 6.20 M/uL    Hemoglobin 12.6 (L) 14.0 - 18.0 g/dL    Hematocrit 37.4 (L) 40.0 - 54.0 %    MCV 84 82 - 98 fL    MCH 28.2 27.0 - 31.0 pg    MCHC 33.7 32.0 - 36.0 g/dL    RDW 15.2 (H) 11.5 - 14.5 %    Platelets 266 150 - 350 K/uL    MPV 9.6 9.2 - 12.9 fL    Gran # 6.2 1.8 - 7.7 K/uL    Lymph # 2.7 1.0 - 4.8 K/uL    Mono # 0.7 0.3 - 1.0 K/uL    Eos # 0.1 0.0 - 0.5 K/uL    Baso # 0.03 0.00 - 0.20 K/uL    Gran% 63.8 38.0 - 73.0 %    Lymph% 27.5 18.0 - 48.0 %    Mono% 7.2 4.0 - 15.0 %    Eosinophil% 1.2 0.0 - " 8.0 %    Basophil% 0.3 0.0 - 1.9 %    Differential Method Automated    Comprehensive metabolic panel   Result Value Ref Range    Sodium 142 136 - 145 mmol/L    Potassium 3.4 (L) 3.5 - 5.1 mmol/L    Chloride 105 95 - 110 mmol/L    CO2 29 23 - 29 mmol/L    Glucose 92 70 - 110 mg/dL    BUN, Bld 19 6 - 20 mg/dL    Creatinine 1.9 (H) 0.5 - 1.4 mg/dL    Calcium 9.5 8.7 - 10.5 mg/dL    Total Protein 8.0 6.0 - 8.4 g/dL    Albumin 3.8 3.5 - 5.2 g/dL    Total Bilirubin 0.7 0.1 - 1.0 mg/dL    Alkaline Phosphatase 82 55 - 135 U/L    AST 36 10 - 40 U/L    ALT 66 (H) 10 - 44 U/L    Anion Gap 8 8 - 16 mmol/L    eGFR if African American 44 (A) >60 mL/min/1.73 m^2    eGFR if non African American 38 (A) >60 mL/min/1.73 m^2   Troponin I #1   Result Value Ref Range    Troponin I 0.008 0.000 - 0.026 ng/mL   B-Type natriuretic peptide (BNP)   Result Value Ref Range    BNP 23 0 - 99 pg/mL         Imaging Results:  Imaging Results          X-Ray Chest PA And Lateral (Final result)  Result time 11/02/17 19:58:47    Final result by Donte Temple Jr., MD (11/02/17 19:58:47)                 Impression:          No acute findings       Electronically signed by: DONTE TEMPLE MD  Date:     11/02/17  Time:    19:58              Narrative:    EXAM:   XR CHEST PA AND LATERAL    CLINICAL HISTORY:  Chest Pain , unspecified    COMPARISON:  None    FINDINGS:   Heart size and pulmonary vascularity appear normal.  Pulmonary emphysema change.  Lungs are well aerated and appear clear. No suspicious mass, infiltrate or effusion.                             The EKG was ordered, reviewed, and independently interpreted by the ED provider.  Interpretation time: 19:00  Rate: 70 BPM  Rhythm: normal sinus rhythm  Interpretation: Normal ECG. No STEMI.             The Emergency Provider reviewed the vital signs and test results, which are outlined above.    ED Discussion     7:59 PM: Reassessed pt at this time.  Pt is awake, alert, and in no distress. Discussed  with pt all pertinent ED information and results. Discussed pt dx and plan of tx. Gave pt all f/u and return to the ED instructions. All questions and concerns were addressed at this time. Pt expresses understanding of information and instructions, and is comfortable with plan to discharge. Pt is stable for discharge.    I discussed with patient and/or family/caretaker that evaluation in the ED does not suggest any emergent or life threatening medical conditions requiring immediate intervention beyond what was provided in the ED, and I believe patient is safe for discharge.  Regardless, an unremarkable evaluation in the ED does not preclude the development or presence of a serious of life threatening condition. As such, patient was instructed to return immediately for any worsening or change in current symptoms.      ED Medication(s):  Medications   albuterol-ipratropium 2.5mg-0.5mg/3mL nebulizer solution 3 mL (3 mLs Nebulization Given 11/2/17 1904)       New Prescriptions    ALBUTEROL 90 MCG/ACTUATION INHALER    Inhale 1-2 puffs into the lungs every 6 (six) hours as needed for Wheezing. Rescue       Follow-up Information     Parvez Lopez MD. Call in 2 days.    Specialty:  Family Medicine  Contact information:  [address] [facility] [id number] [phone]                     Medical Decision Making    Medical Decision Making:   Clinical Tests:   Lab Tests: Ordered and Reviewed  Radiological Study: Ordered and Reviewed  Medical Tests: Ordered and Reviewed           Scribe Attestation:   Scribe #1: I performed the above scribed service and the documentation accurately describes the services I performed. I attest to the accuracy of the note.    Attending:   Physician Attestation Statement for Scribe #1: I, Mesfin Gonzalez Jr., MD, personally performed the services described in this documentation, as scribed by Obi Mendez, in my presence, and it is both accurate and complete.          Clinical Impression        ICD-10-CM ICD-9-CM   1. COPD exacerbation J44.1 491.21   2. Smoker F17.200 305.1       Disposition:   Disposition: Discharged  Condition: Stable         Msefin Gonzalez Jr., MD  11/02/17 2009

## 2017-11-02 NOTE — ED NOTES
Level of Consciousness: Patient is awake, alert, oriented to person, place, time, and situation.   Appearance: Pt resting comfortably in stretcher, no acute distress at this time. Clothing appropriately placed and clean. Hygiene is appropriate.   Skin: Skin is warm, dry, and intact. Skin turgor is normal/elastic. Mucous membranes moist. Skin color is normal for ethnicity. No skin breakdown noted.   Musculoskeletal: Moves all extremities well. Full active ROM. No deformities noted. Denies any weakness. Gait steady, ambulates without use of assistive devices.   Respiratory: Airway open and patent. Respirations equal and unlabored. Breath sounds clear to auscultation.  Pt c/o SOB.   Cardiac: Regular rate and rhythm. No peripheral edema noted. Radial and pedal pulses present and normal. Capillary refill is within normal limits. Denies chest pain.   GI: Abdomen soft, non-tender to all quadrants with palpitation. Bowel sounds present and active in all quads. Abdomen symmetric with no distention noted. Denies any N/V/D.   Neurological: Symmetrical expressions noted to face. Equal bilateral . Normal sensation reported to all extremities. No obvious neurological deficits noted.   Psychosocial: Speech spontaneous, clear, and coherent.

## 2017-11-03 NOTE — ED NOTES
Pt states feeling much better since breathing treatment.  States has been under a lot of stress at home with family and felt like he was having an anxiety attack.

## 2017-11-13 ENCOUNTER — TELEPHONE (OUTPATIENT)
Dept: HEMATOLOGY/ONCOLOGY | Facility: CLINIC | Age: 57
End: 2017-11-13

## 2017-11-14 ENCOUNTER — TELEPHONE (OUTPATIENT)
Dept: INTERNAL MEDICINE | Facility: CLINIC | Age: 57
End: 2017-11-14

## 2017-11-14 RX ORDER — OXYMORPHONE HYDROCHLORIDE 30 MG/1
30 TABLET, FILM COATED, EXTENDED RELEASE ORAL EVERY 12 HOURS
Qty: 60 TABLET | Refills: 0 | OUTPATIENT
Start: 2017-11-14

## 2017-11-14 NOTE — TELEPHONE ENCOUNTER
----- Message from Robi Ferreira sent at 11/14/2017  9:00 AM CST -----  Contact: [phone]  Would like to consult with nurse about script for oxymorfon 30mg. Please call bk at [phone]. thx tutu [facility] - [place] - [address] [address]  [place] [address]  Phone: [phone] Fax: [phone]

## 2017-11-14 NOTE — TELEPHONE ENCOUNTER
Per Dr. Lopez, patient informed:    3 days early and inform pt that I am going to reduce dose down this month (Routing comment)

## 2017-11-16 ENCOUNTER — TELEPHONE (OUTPATIENT)
Dept: PHARMACY | Facility: CLINIC | Age: 57
End: 2017-11-16

## 2017-11-16 NOTE — TELEPHONE ENCOUNTER
Called patient for QOL at [facility].  All attempts to reach patient to date have been unsuccessful.  No further action will be taken unless patient contacts pharmacy.     ILENE Patel.Ph.  Clinical Pharmacist  [facility]  Phone: [phone]

## 2017-11-17 RX ORDER — OXYMORPHONE HYDROCHLORIDE 20 MG/1
20 TABLET, FILM COATED, EXTENDED RELEASE ORAL EVERY 12 HOURS
Qty: 60 TABLET | Refills: 0 | Status: SHIPPED | OUTPATIENT
Start: 2017-11-17 | End: 2017-12-21 | Stop reason: SDUPTHER

## 2017-11-17 RX ORDER — OXYMORPHONE HYDROCHLORIDE 30 MG/1
30 TABLET, FILM COATED, EXTENDED RELEASE ORAL EVERY 12 HOURS
Qty: 60 TABLET | Refills: 0 | OUTPATIENT
Start: 2017-11-17

## 2017-11-17 NOTE — TELEPHONE ENCOUNTER
----- Message from Scarlett Miller sent at 11/17/2017  9:09 AM CST -----  Contact: pt  Pt is calling nurse staff regarding a refill RX oxymorphone.  Please call this number only to be advised [phone] thanks    [facility] - [place] - [address] [address]  [place] [address]  Phone: [phone] Fax: [phone]

## 2017-11-17 NOTE — TELEPHONE ENCOUNTER
----- Message from Erika Floyd sent at 11/16/2017  4:36 PM CST -----  Contact: pt  Calling about a missed call and Rx medication please advise [phone]

## 2017-11-22 RX ORDER — OXYCODONE HYDROCHLORIDE 30 MG/1
30 TABLET ORAL EVERY 8 HOURS PRN
Qty: 90 TABLET | Refills: 0 | OUTPATIENT
Start: 2017-11-22

## 2017-11-22 NOTE — TELEPHONE ENCOUNTER
----- Message from Opal Park sent at 11/22/2017  8:47 AM CST -----  Contact: self [phone]  1. What is the name of the medication you are requesting? oxycodone  2. What is the dose? 30mg  3. How do you take the medication? Orally, topically, etc? orally  4. How often do you take this medication? 3x daily  5. Do you need a 30 day or 90 day supply? 30 day  6. How many refills are you requesting? 1  7. What is your preferred pharmacy and location of the pharmacy?     [facility] - [place] - [address] [address] [address]  Phone: [phone] Fax: [phone]    8. Who can we contact with further questions? Pt [phone]

## 2017-11-24 ENCOUNTER — PATIENT OUTREACH (OUTPATIENT)
Dept: ADMINISTRATIVE | Facility: HOSPITAL | Age: 57
End: 2017-11-24

## 2017-11-24 DIAGNOSIS — G89.4 CHRONIC PAIN SYNDROME: Primary | Chronic | ICD-10-CM

## 2017-11-24 RX ORDER — OXYCODONE HYDROCHLORIDE 30 MG/1
30 TABLET ORAL EVERY 8 HOURS PRN
Qty: 90 TABLET | Refills: 0 | OUTPATIENT
Start: 2017-11-24

## 2017-11-24 RX ORDER — OXYCODONE HYDROCHLORIDE 15 MG/1
15 TABLET ORAL EVERY 8 HOURS PRN
Qty: 90 TABLET | Refills: 0 | Status: SHIPPED | OUTPATIENT
Start: 2017-11-24 | End: 2017-12-26 | Stop reason: SDUPTHER

## 2017-11-24 NOTE — TELEPHONE ENCOUNTER
----- Message from Elma Rao sent at 11/24/2017  9:28 AM CST -----  Pt states he need to talk to you about getting a prescription refill on oxycodone 30 mg.pt states he call about this on Wednesday.

## 2017-11-24 NOTE — TELEPHONE ENCOUNTER
----- Message from Linda Parmar sent at 11/24/2017  1:27 PM CST -----  Contact: PT   PT requesting call from nurse on [phone] (work)

## 2017-12-18 ENCOUNTER — PATIENT OUTREACH (OUTPATIENT)
Dept: ADMINISTRATIVE | Facility: HOSPITAL | Age: 57
End: 2017-12-18

## 2017-12-19 ENCOUNTER — OFFICE VISIT (OUTPATIENT)
Dept: INTERNAL MEDICINE | Facility: CLINIC | Age: 57
End: 2017-12-19
Payer: MEDICARE

## 2017-12-19 VITALS
TEMPERATURE: 97 F | DIASTOLIC BLOOD PRESSURE: 74 MMHG | WEIGHT: 153.44 LBS | HEIGHT: 71 IN | OXYGEN SATURATION: 99 % | BODY MASS INDEX: 21.48 KG/M2 | HEART RATE: 105 BPM | SYSTOLIC BLOOD PRESSURE: 132 MMHG

## 2017-12-19 DIAGNOSIS — G89.4 CHRONIC PAIN SYNDROME: Primary | Chronic | ICD-10-CM

## 2017-12-19 DIAGNOSIS — B18.2 CHRONIC HEPATITIS C WITHOUT HEPATIC COMA: Chronic | ICD-10-CM

## 2017-12-19 PROCEDURE — 99999 PR PBB SHADOW E&M-EST. PATIENT-LVL III: CPT | Mod: PBBFAC,,, | Performed by: FAMILY MEDICINE

## 2017-12-19 PROCEDURE — 99499 UNLISTED E&M SERVICE: CPT | Mod: S$GLB,,, | Performed by: FAMILY MEDICINE

## 2017-12-19 PROCEDURE — 99212 OFFICE O/P EST SF 10 MIN: CPT | Mod: S$GLB,,, | Performed by: FAMILY MEDICINE

## 2017-12-19 NOTE — PROGRESS NOTES
"Subjective:       Patient ID: Beltran Cruz is a 57 y.o. male.    Chief Complaint: Follow-up    57-year-old Afro-American male patient of Dr. Lopez here with Patient Active Problem List:     Chronic pain     Transaminitis     Substance abuse     Tobacco abuse     Chronic hepatitis C     Chronic obstructive pulmonary disease     Dysphagia     Malignant neoplasm of transverse colon     Hiatal hernia with GERD     Tricuspid regurgitation     Needs smoking cessation education     History of colon cancer  For refill on his medications Opana and Roxicodone.  Patient reports that he has an appointment with pain management scheduled next month, and Dr. Lopez has been weaning  his doses on pain medication.  Patient could not make his appointment with Dr. Lopez this morning and cancelled his appointment scheduled tomorrow  Denies of any other complaints today          Review of Systems   Musculoskeletal: Positive for arthralgias, back pain and myalgias.         /74 (BP Location: Right arm, Patient Position: Sitting)   Pulse 105   Temp 96.8 °F (36 °C) (Tympanic)   Ht 5' 11" (1.803 m)   Wt 69.6 kg (153 lb 7 oz)   SpO2 99%   BMI 21.40 kg/m²   Objective:      Physical Exam   Constitutional: He is oriented to person, place, and time. He appears well-developed and well-nourished.   HENT:   Head: Normocephalic and atraumatic.   Cardiovascular: Normal rate, regular rhythm and normal heart sounds.    Pulmonary/Chest: Effort normal and breath sounds normal.   Abdominal: Soft. Bowel sounds are normal.   Musculoskeletal: He exhibits tenderness.   Positive for chronic low back pain   Neurological: He is alert and oriented to person, place, and time.   Skin: Skin is warm and dry.         Assessment:       1. Chronic pain syndrome    2. Chronic hepatitis C without hepatic coma        Plan:   Chronic pain syndrome- patient was advised to discuss further with his PCP regarding pain medications and follow-up with pain " management  Informed that he will not be able to get Opana and Roxicodone today  Patient needs to reschedule his appointment with  PCP    Chronic hepatitis C without hepatic coma

## 2017-12-20 ENCOUNTER — PATIENT OUTREACH (OUTPATIENT)
Dept: ADMINISTRATIVE | Facility: HOSPITAL | Age: 57
End: 2017-12-20

## 2017-12-20 ENCOUNTER — OFFICE VISIT (OUTPATIENT)
Dept: INTERNAL MEDICINE | Facility: CLINIC | Age: 57
End: 2017-12-20
Payer: MEDICARE

## 2017-12-20 VITALS
HEART RATE: 101 BPM | TEMPERATURE: 97 F | DIASTOLIC BLOOD PRESSURE: 62 MMHG | WEIGHT: 157.88 LBS | BODY MASS INDEX: 22.6 KG/M2 | HEIGHT: 70 IN | SYSTOLIC BLOOD PRESSURE: 100 MMHG

## 2017-12-20 DIAGNOSIS — Z00.00 ANNUAL PHYSICAL EXAM: ICD-10-CM

## 2017-12-20 DIAGNOSIS — G89.4 CHRONIC PAIN SYNDROME: Primary | Chronic | ICD-10-CM

## 2017-12-20 DIAGNOSIS — F19.10 SUBSTANCE ABUSE: Chronic | ICD-10-CM

## 2017-12-20 PROCEDURE — 99396 PREV VISIT EST AGE 40-64: CPT | Mod: S$GLB,,, | Performed by: FAMILY MEDICINE

## 2017-12-20 PROCEDURE — 99999 PR PBB SHADOW E&M-EST. PATIENT-LVL III: CPT | Mod: PBBFAC,,, | Performed by: FAMILY MEDICINE

## 2017-12-20 NOTE — PROGRESS NOTES
Subjective:       Patient ID: Beltran Cruz is a 57 y.o. male.    Chief Complaint: Medication Refill    Annual exam:       Pt is a 57 year old      Medication Refill   This is a chronic problem. The current episode started today. The problem occurs constantly. The problem has been unchanged. Pertinent negatives include no change in bowel habit, chest pain, chills, headaches, joint swelling, nausea or neck pain.     Review of Systems   Constitutional: Negative for chills.   Respiratory: Negative.    Cardiovascular: Negative for chest pain.   Gastrointestinal: Negative.  Negative for change in bowel habit and nausea.   Genitourinary: Negative.    Musculoskeletal: Negative for joint swelling and neck pain.   Neurological: Negative for headaches.   Hematological: Negative.    Psychiatric/Behavioral: Negative.        Objective:      Physical Exam   Constitutional: He is oriented to person, place, and time. He appears well-developed and well-nourished.   HENT:   Head: Normocephalic.   Eyes: EOM are normal. Pupils are equal, round, and reactive to light.   Neck: Normal range of motion. Neck supple. No JVD present. No thyromegaly present.   Cardiovascular: Normal rate and regular rhythm.    Pulmonary/Chest: Effort normal and breath sounds normal.   Abdominal: Soft. Bowel sounds are normal. There is no tenderness. There is no guarding.   Musculoskeletal: Normal range of motion.   Lymphadenopathy:     He has no cervical adenopathy.   Neurological: He is alert and oriented to person, place, and time. He has normal reflexes.   Skin: Skin is warm and dry.   Psychiatric: He has a normal mood and affect. His behavior is normal.       Assessment:       1. Chronic pain syndrome    2. Annual physical exam    3. Substance abuse        Plan:       Chronic pain syndrome  Comments:  Pt will be going to Pain management in January    Annual physical exam  Comments:  Will do CBC, CMP, Lipid and PSA  Orders:  -     CBC auto differential;  Future; Expected date: 12/20/2017  -     Comprehensive metabolic panel; Future; Expected date: 12/20/2017  -     Lipid panel; Future; Expected date: 12/20/2017  -     PSA, Screening; Future; Expected date: 12/20/2017    Substance abuse  Comments:  Will send pt to Psychology  Orders:  -     Ambulatory referral to Psychology

## 2017-12-21 NOTE — TELEPHONE ENCOUNTER
----- Message from Arelis Martinez sent at 12/21/2017  1:11 PM CST -----  Contact: pt  States his prescription (oxymorphon 20 mg) is not at the [facility]. States he is having fever and chills and he has lab work tomorrow and he would like to know if he should wait to do his lab work. Please call pt at [phone]. Thank you

## 2017-12-21 NOTE — TELEPHONE ENCOUNTER
----- Message from Chencho Sullivan sent at 12/20/2017  4:53 PM CST -----  Contact: Pt  Pt called to say he has been sitting at his pharmacy for his medication and they still have not received it...  [facility] - [place] - [address] [id number] [facility] [address]  Phone: [phone] Fax: [phone]    Please contact the pt at [phone]

## 2017-12-22 RX ORDER — OXYMORPHONE HYDROCHLORIDE 20 MG/1
20 TABLET, FILM COATED, EXTENDED RELEASE ORAL EVERY 12 HOURS
Qty: 60 TABLET | Refills: 0 | Status: SHIPPED | OUTPATIENT
Start: 2017-12-22 | End: 2019-05-13

## 2017-12-26 RX ORDER — OXYCODONE HYDROCHLORIDE 15 MG/1
15 TABLET ORAL EVERY 8 HOURS PRN
Qty: 90 TABLET | Refills: 0 | Status: SHIPPED | OUTPATIENT
Start: 2017-12-26 | End: 2018-01-22 | Stop reason: SDUPTHER

## 2018-01-22 NOTE — TELEPHONE ENCOUNTER
Patient is working with insurance company to see what physician he is able to see that will write for suboxone

## 2018-01-23 RX ORDER — OXYCODONE HYDROCHLORIDE 15 MG/1
15 TABLET ORAL EVERY 8 HOURS PRN
Qty: 90 TABLET | Refills: 0 | Status: SHIPPED | OUTPATIENT
Start: 2018-01-23 | End: 2018-02-23 | Stop reason: SDUPTHER

## 2018-02-20 ENCOUNTER — TELEPHONE (OUTPATIENT)
Dept: INTERNAL MEDICINE | Facility: CLINIC | Age: 58
End: 2018-02-20

## 2018-02-20 NOTE — TELEPHONE ENCOUNTER
----- Message from Dionicio Holley sent at 2/20/2018  1:53 PM CST -----  Contact: pt  Pt is calling in regards to begin in a lot of pain please contact him at [phone]

## 2018-02-20 NOTE — TELEPHONE ENCOUNTER
Patient stated that he is on the waiting list to see a doctor to get up on suboxone. He stated that it is getting very difficult to get in with the doctor. He stated that he is still trying

## 2018-02-23 NOTE — TELEPHONE ENCOUNTER
Patient would like an external referral to pain management sent to Dr. Joey Braxton. There phone is [phone] and fax is [phone]. He has an appointment scheduled with them on march 12 at 3:00pm. Patient asking for one more refill until appointment.

## 2018-02-23 NOTE — TELEPHONE ENCOUNTER
----- Message from Lo Nam sent at 2/23/2018  8:35 AM CST -----  pls send over pain management referral to dr janette barney (p:[phone]), would also like pain meds called into to last him to 3/12 appt...[phone] (home)     [facility] - [place] - [address] [facility]  [address] [facility]  [place] [address]  Phone: [phone] Fax: [phone]

## 2018-02-24 ENCOUNTER — NURSE TRIAGE (OUTPATIENT)
Dept: ADMINISTRATIVE | Facility: CLINIC | Age: 58
End: 2018-02-24

## 2018-02-25 RX ORDER — OXYCODONE HYDROCHLORIDE 15 MG/1
15 TABLET ORAL EVERY 8 HOURS PRN
Qty: 90 TABLET | Refills: 0 | Status: SHIPPED | OUTPATIENT
Start: 2018-02-25 | End: 2019-05-13

## 2018-03-26 PROBLEM — Z00.00 ANNUAL PHYSICAL EXAM: Status: RESOLVED | Noted: 2017-12-20 | Resolved: 2018-03-26

## 2018-05-28 ENCOUNTER — PES CALL (OUTPATIENT)
Dept: ADMINISTRATIVE | Facility: CLINIC | Age: 58
End: 2018-05-28

## 2018-06-26 NOTE — TELEPHONE ENCOUNTER
----- Message from Alma Rosa Bell sent at 6/22/2017 10:12 AM CDT -----  Contact: pt chio sunshine   States she's calling rg the rx that was picked was oxycodone but didn't get the oxymorphone  40mg and states tht the other one was going for approval from [employer] and can be reached at [phone]//thanks/dbw    room air

## 2018-11-20 ENCOUNTER — TELEPHONE (OUTPATIENT)
Dept: INTERNAL MEDICINE | Facility: CLINIC | Age: 58
End: 2018-11-20

## 2018-11-20 NOTE — TELEPHONE ENCOUNTER
----- Message from Nelly Hill sent at 11/20/2018  3:42 PM CST -----  Contact: Maicol marinelli/Willy Navarrete.  Caller wants to know if the order for a large back brace that was faxed received   call back number  [phone]

## 2018-11-20 NOTE — TELEPHONE ENCOUNTER
Returned call. Informed [facility] that patient has not been seen in a year and a prescription will not be filled out for a back brace until patient is seen

## 2019-04-27 ENCOUNTER — HOSPITAL ENCOUNTER (EMERGENCY)
Facility: HOSPITAL | Age: 59
Discharge: HOME | End: 2019-04-27
Attending: EMERGENCY MEDICINE
Payer: MEDICARE

## 2019-04-27 VITALS
OXYGEN SATURATION: 99 % | HEART RATE: 56 BPM | SYSTOLIC BLOOD PRESSURE: 104 MMHG | DIASTOLIC BLOOD PRESSURE: 56 MMHG | RESPIRATION RATE: 16 BRPM | TEMPERATURE: 98 F

## 2019-04-27 DIAGNOSIS — E86.0 DEHYDRATION: Primary | ICD-10-CM

## 2019-04-27 DIAGNOSIS — Z72.0 TOBACCO ABUSE: Chronic | ICD-10-CM

## 2019-04-27 DIAGNOSIS — J44.9 CHRONIC OBSTRUCTIVE PULMONARY DISEASE, UNSPECIFIED COPD TYPE: Chronic | ICD-10-CM

## 2019-04-27 DIAGNOSIS — R41.0 CONFUSION: ICD-10-CM

## 2019-04-27 DIAGNOSIS — B18.2 CHRONIC HEPATITIS C WITHOUT HEPATIC COMA: Chronic | ICD-10-CM

## 2019-04-27 LAB
ALBUMIN SERPL BCP-MCNC: 3.6 G/DL (ref 3.5–5.2)
ALP SERPL-CCNC: 70 U/L (ref 55–135)
ALT SERPL W/O P-5'-P-CCNC: 159 U/L (ref 10–44)
AMMONIA PLAS-SCNC: 52 UMOL/L (ref 10–50)
AMPHET+METHAMPHET UR QL: NEGATIVE
ANION GAP SERPL CALC-SCNC: 8 MMOL/L (ref 8–16)
APTT BLDCRRT: 31.8 SEC (ref 21–32)
AST SERPL-CCNC: 105 U/L (ref 10–40)
BARBITURATES UR QL SCN>200 NG/ML: NEGATIVE
BASOPHILS # BLD AUTO: 0.03 K/UL (ref 0–0.2)
BASOPHILS NFR BLD: 0.5 % (ref 0–1.9)
BENZODIAZ UR QL SCN>200 NG/ML: NORMAL
BILIRUB SERPL-MCNC: 0.6 MG/DL (ref 0.1–1)
BILIRUB UR QL STRIP: NEGATIVE
BUN SERPL-MCNC: 21 MG/DL (ref 6–20)
BZE UR QL SCN: NEGATIVE
CALCIUM SERPL-MCNC: 9 MG/DL (ref 8.7–10.5)
CANNABINOIDS UR QL SCN: NEGATIVE
CHLORIDE SERPL-SCNC: 111 MMOL/L (ref 95–110)
CLARITY UR: CLEAR
CO2 SERPL-SCNC: 21 MMOL/L (ref 23–29)
COLOR UR: YELLOW
CREAT SERPL-MCNC: 0.8 MG/DL (ref 0.5–1.4)
CREAT UR-MCNC: 82 MG/DL (ref 23–375)
DIFFERENTIAL METHOD: ABNORMAL
EOSINOPHIL # BLD AUTO: 0.4 K/UL (ref 0–0.5)
EOSINOPHIL NFR BLD: 5.6 % (ref 0–8)
ERYTHROCYTE [DISTWIDTH] IN BLOOD BY AUTOMATED COUNT: 14.8 % (ref 11.5–14.5)
EST. GFR  (AFRICAN AMERICAN): >60 ML/MIN/1.73 M^2
EST. GFR  (NON AFRICAN AMERICAN): >60 ML/MIN/1.73 M^2
GLUCOSE SERPL-MCNC: 91 MG/DL (ref 70–110)
GLUCOSE UR QL STRIP: NEGATIVE
HCT VFR BLD AUTO: 38.8 % (ref 40–54)
HGB BLD-MCNC: 13.1 G/DL (ref 14–18)
HGB UR QL STRIP: NEGATIVE
INR PPP: 0.9 (ref 0.8–1.2)
KETONES UR QL STRIP: NEGATIVE
LEUKOCYTE ESTERASE UR QL STRIP: NEGATIVE
LYMPHOCYTES # BLD AUTO: 2.4 K/UL (ref 1–4.8)
LYMPHOCYTES NFR BLD: 38.6 % (ref 18–48)
MCH RBC QN AUTO: 28.9 PG (ref 27–31)
MCHC RBC AUTO-ENTMCNC: 33.8 G/DL (ref 32–36)
MCV RBC AUTO: 86 FL (ref 82–98)
METHADONE UR QL SCN>300 NG/ML: NEGATIVE
MONOCYTES # BLD AUTO: 0.6 K/UL (ref 0.3–1)
MONOCYTES NFR BLD: 10.1 % (ref 4–15)
NEUTROPHILS # BLD AUTO: 2.8 K/UL (ref 1.8–7.7)
NEUTROPHILS NFR BLD: 45.2 % (ref 38–73)
NITRITE UR QL STRIP: NEGATIVE
OPIATES UR QL SCN: NEGATIVE
PCP UR QL SCN>25 NG/ML: NEGATIVE
PH UR STRIP: 5 [PH] (ref 5–8)
PLATELET # BLD AUTO: 236 K/UL (ref 150–350)
PMV BLD AUTO: 10.4 FL (ref 9.2–12.9)
POCT GLUCOSE: 92 MG/DL (ref 70–110)
POTASSIUM SERPL-SCNC: 4 MMOL/L (ref 3.5–5.1)
PROT SERPL-MCNC: 7.6 G/DL (ref 6–8.4)
PROT UR QL STRIP: NEGATIVE
PROTHROMBIN TIME: 10.3 SEC (ref 9–12.5)
RBC # BLD AUTO: 4.53 M/UL (ref 4.6–6.2)
SODIUM SERPL-SCNC: 140 MMOL/L (ref 136–145)
SP GR UR STRIP: 1.02 (ref 1–1.03)
TOXICOLOGY INFORMATION: NORMAL
TROPONIN I SERPL DL<=0.01 NG/ML-MCNC: 0.01 NG/ML (ref 0–0.03)
URN SPEC COLLECT METH UR: NORMAL
UROBILINOGEN UR STRIP-ACNC: NEGATIVE EU/DL
WBC # BLD AUTO: 6.22 K/UL (ref 3.9–12.7)

## 2019-04-27 PROCEDURE — 81003 URINALYSIS AUTO W/O SCOPE: CPT | Mod: HCNC,59

## 2019-04-27 PROCEDURE — 85025 COMPLETE CBC W/AUTO DIFF WBC: CPT | Mod: HCNC

## 2019-04-27 PROCEDURE — 96360 HYDRATION IV INFUSION INIT: CPT | Mod: HCNC

## 2019-04-27 PROCEDURE — 82140 ASSAY OF AMMONIA: CPT | Mod: HCNC

## 2019-04-27 PROCEDURE — 93010 EKG 12-LEAD: ICD-10-PCS | Mod: HCNC,,, | Performed by: INTERNAL MEDICINE

## 2019-04-27 PROCEDURE — 93010 ELECTROCARDIOGRAM REPORT: CPT | Mod: HCNC,,, | Performed by: INTERNAL MEDICINE

## 2019-04-27 PROCEDURE — 82962 GLUCOSE BLOOD TEST: CPT | Mod: HCNC

## 2019-04-27 PROCEDURE — 93005 ELECTROCARDIOGRAM TRACING: CPT | Mod: HCNC

## 2019-04-27 PROCEDURE — 85610 PROTHROMBIN TIME: CPT | Mod: HCNC

## 2019-04-27 PROCEDURE — 80307 DRUG TEST PRSMV CHEM ANLYZR: CPT | Mod: HCNC

## 2019-04-27 PROCEDURE — 84484 ASSAY OF TROPONIN QUANT: CPT | Mod: HCNC

## 2019-04-27 PROCEDURE — 25000003 PHARM REV CODE 250: Mod: HCNC | Performed by: EMERGENCY MEDICINE

## 2019-04-27 PROCEDURE — 85730 THROMBOPLASTIN TIME PARTIAL: CPT | Mod: HCNC

## 2019-04-27 PROCEDURE — 96361 HYDRATE IV INFUSION ADD-ON: CPT | Mod: HCNC

## 2019-04-27 PROCEDURE — 80053 COMPREHEN METABOLIC PANEL: CPT | Mod: HCNC

## 2019-04-27 PROCEDURE — 99285 EMERGENCY DEPT VISIT HI MDM: CPT | Mod: 25,HCNC

## 2019-04-27 RX ADMIN — SODIUM CHLORIDE 500 ML: 0.9 INJECTION, SOLUTION INTRAVENOUS at 12:04

## 2019-04-27 RX ADMIN — SODIUM CHLORIDE 500 ML: 0.9 INJECTION, SOLUTION INTRAVENOUS at 11:04

## 2019-04-27 NOTE — ED PROVIDER NOTES
SCRIBE #1 NOTE: I, Kiara Gan, am scribing for, and in the presence of, Leonel Rao Jr., MD. I have scribed the entire note.       History     Chief Complaint   Patient presents with    Altered Mental Status     weakness and confusion     Review of patient's allergies indicates:   Allergen Reactions    Penicillins Shortness Of Breath     Throat swelling          History of Present Illness     HPI    4/27/2019, 10:24 AM  History obtained from the patient   Pt is a poor historian      History of Present Illness: Beltran Cruz is a 58 y.o. male patient with a PMHx of colon CA, COPD, and Hep-C who presents to the Emergency Department for evaluation of confusion which onset gradually at an unknown time PTA. Symptoms are constant and moderate in severity. No mitigating or exacerbating factors reported. Associated sxs include generalized weakness. Patient denies any fever, chills, n/v, abd pain, CP, SOB, dysuria, frequency, and all other sxs at this time. Pt denies EtOH use. He states he is currently taking suboxone. No further complaints or concerns at this time.       Arrival mode: [facility]    PCP: Parvez Lopez MD        Past Medical History:  Past Medical History:   Diagnosis Date    Back pain     Cancer     Colon    Chronic hepatitis C     Chronic obstructive pulmonary disease 8/9/2016    Colon cancer 10/2016    T1 N0    Colon polyps     Colonoscopy 9/6/2016    Diverticulosis     Colonoscopy 9/6/2016    Drug overdose, intentional 03/22/2008    benzo,opiates, alcohol, asa , acetomenopjen    Hemorrhoids     Colonoscopy 9/6/2016    Screening PSA (prostate specific antigen) 8/24/2016       Past Surgical History:  Past Surgical History:   Procedure Laterality Date    ANKLE SURGERY      APPENDECTOMY      BACK SURGERY      COLONOSCOPY N/A 7/17/2017    Performed by [facility]rell [facility] at [facility]    COLONOSCOPY N/A 9/6/2016    Performed by [facility]rell [facility] at [facility] END[facility]    ESOPHAGOGASTRODUODENOSCOPY  (EGD) N/A 9/6/2016    Per[facility] at [facility] END[facility]    HEMICOLECTOMY  10/4/ 2016    laprascopic for colon ca    HEMICOLECTOMY-LAPAROSCOPIC Left 10/4/2016    Performed by Ryland Galeana MD at [facility] OR    INCISION AND DRAINAGE (I&D), ABSCESS, Left arm Left 6/26/2014    Performed by Louis O. Jeansonne IV, MD at [facility] OR    SHOULDER SURGERY           Family History:  History reviewed. No pertinent family history.    Social History:  Social History     Tobacco Use    Smoking status: Current Every Day Smoker     Packs/day: 1.00     Years: 42.00     Pack years: 42.00     Types: Cigarettes    Smokeless tobacco: Never Used    Tobacco comment: no smoking after 12 midnight prior to surgery   Substance and Sexual Activity    Alcohol use: Yes     Comment: social    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    Sexual activity: Unknown        Review of Systems     Review of Systems   Constitutional: Negative for chills and fever.        (+) generalized weakness   HENT: Negative for congestion and sore throat.    Respiratory: Negative for cough and shortness of breath.    Cardiovascular: Negative for chest pain.   Gastrointestinal: Negative for abdominal pain, nausea and vomiting.   Genitourinary: Negative for dysuria and frequency.   Musculoskeletal: Negative for back pain and neck pain.   Skin: Negative for rash.   Neurological: Negative for dizziness, weakness and headaches.   Hematological: Does not bruise/bleed easily.   Psychiatric/Behavioral: Positive for confusion.   All other systems reviewed and are negative.       Physical Exam     Initial Vitals [04/27/19 1000]   BP Pulse Resp Temp SpO2   110/78 63 18 97.9 °F (36.6 °C) 97 %      MAP       --          Physical Exam  Nursing Notes and Vital Signs Reviewed.  Constitutional: Patient is in no acute distress. Well-developed and well-nourished. Appears drowsy.  Head: Atraumatic. Normocephalic.  Eyes: PERRL. EOM intact. Conjunctivae are not  pale. No scleral icterus.  ENT: Mucous membranes are moist. Oropharynx is clear and symmetric.    Neck: Supple. Full ROM. No lymphadenopathy.  Cardiovascular: Regular rate. Regular rhythm. No murmurs, rubs, or gallops. Distal pulses are 2+ and symmetric.  Pulmonary/Chest: No respiratory distress. Clear to auscultation bilaterally. No wheezing or rales.  Abdominal: Soft and non-distended.  There is no tenderness.  No rebound, guarding, or rigidity. Good bowel sounds.  Genitourinary: No CVA tenderness  Musculoskeletal: Moves all extremities. No obvious deformities. No edema. No calf tenderness.  Skin: Warm and dry.  Neurological: Patient is alert and oriented to person, place and time. Pupils ERRL and EOM normal. Cranial nerves II-XII are intact. Strength is full bilaterally; it is equal and 5/5 in bilateral upper and lower extremities. There is no pronator drift of outstretched arms. Light touch sense is intact. Speech is clear and normal. No acute focal neurological deficits noted.  Psychiatric: Normal affect. Good eye contact. Appropriate in content.     ED Course   Procedures  ED Vital Signs:  Vitals:    04/27/19 1000 04/27/19 1053 04/27/19 1117 04/27/19 1133   BP: 110/78 (!) 90/55 (!) 88/53 97/61   Pulse: 63 (!) 58 (!) 52 (!) 54   Resp: 18  12 16   Temp: 97.9 °F (36.6 °C)      TempSrc: Oral      SpO2: 97% 97% 97% 99%    04/27/19 1148 04/27/19 1228   BP: 106/66 101/66   Pulse: (!) 47 (!) 48   Resp: (!) 21 11   Temp:     TempSrc:     SpO2: 99% 100%       Abnormal Lab Results:  Labs Reviewed   CBC W/ AUTO DIFFERENTIAL - Abnormal; Notable for the following components:       Result Value    RBC 4.53 (*)     Hemoglobin 13.1 (*)     Hematocrit 38.8 (*)     RDW 14.8 (*)     All other components within normal limits   COMPREHENSIVE METABOLIC PANEL - Abnormal; Notable for the following components:    Chloride 111 (*)     CO2 21 (*)     BUN, Bld 21 (*)      (*)      (*)     All other components within normal  limits   AMMONIA - Abnormal; Notable for the following components:    Ammonia 52 (*)     All other components within normal limits   PROTIME-INR   APTT   TROPONIN I   URINALYSIS   DRUG SCREEN PANEL, URINE EMERGENCY   POCT GLUCOSE   POCT GLUCOSE MONITORING CONTINUOUS        All Lab Results:  Results for orders placed or performed during the hospital encounter of 04/27/19   CBC auto differential   Result Value Ref Range    WBC 6.22 3.90 - 12.70 K/uL    RBC 4.53 (L) 4.60 - 6.20 M/uL    Hemoglobin 13.1 (L) 14.0 - 18.0 g/dL    Hematocrit 38.8 (L) 40.0 - 54.0 %    MCV 86 82 - 98 fL    MCH 28.9 27.0 - 31.0 pg    MCHC 33.8 32.0 - 36.0 g/dL    RDW 14.8 (H) 11.5 - 14.5 %    Platelets 236 150 - 350 K/uL    MPV 10.4 9.2 - 12.9 fL    Gran # (ANC) 2.8 1.8 - 7.7 K/uL    Lymph # 2.4 1.0 - 4.8 K/uL    Mono # 0.6 0.3 - 1.0 K/uL    Eos # 0.4 0.0 - 0.5 K/uL    Baso # 0.03 0.00 - 0.20 K/uL    Gran% 45.2 38.0 - 73.0 %    Lymph% 38.6 18.0 - 48.0 %    Mono% 10.1 4.0 - 15.0 %    Eosinophil% 5.6 0.0 - 8.0 %    Basophil% 0.5 0.0 - 1.9 %    Differential Method Automated    Comprehensive metabolic panel   Result Value Ref Range    Sodium 140 136 - 145 mmol/L    Potassium 4.0 3.5 - 5.1 mmol/L    Chloride 111 (H) 95 - 110 mmol/L    CO2 21 (L) 23 - 29 mmol/L    Glucose 91 70 - 110 mg/dL    BUN, Bld 21 (H) 6 - 20 mg/dL    Creatinine 0.8 0.5 - 1.4 mg/dL    Calcium 9.0 8.7 - 10.5 mg/dL    Total Protein 7.6 6.0 - 8.4 g/dL    Albumin 3.6 3.5 - 5.2 g/dL    Total Bilirubin 0.6 0.1 - 1.0 mg/dL    Alkaline Phosphatase 70 55 - 135 U/L     (H) 10 - 40 U/L     (H) 10 - 44 U/L    Anion Gap 8 8 - 16 mmol/L    eGFR if African American >60 >60 mL/min/1.73 m^2    eGFR if non African American >60 >60 mL/min/1.73 m^2   Ammonia   Result Value Ref Range    Ammonia 52 (H) 10 - 50 umol/L   Protime-INR   Result Value Ref Range    Prothrombin Time 10.3 9.0 - 12.5 sec    INR 0.9 0.8 - 1.2   APTT   Result Value Ref Range    aPTT 31.8 21.0 - 32.0 sec   Troponin  I   Result Value Ref Range    Troponin I 0.006 0.000 - 0.026 ng/mL   POCT glucose   Result Value Ref Range    POCT Glucose 92 70 - 110 mg/dL       Imaging Results:  Imaging Results          X-Ray Chest 1 View (Final result)  Result time 04/27/19 11:04:43    Final result by Obi Walters MD (04/27/19 11:04:43)                 Impression:      No acute process seen.      Electronically signed by: Obi Walters MD  Date:    04/27/2019  Time:    11:04             Narrative:    EXAMINATION:  XR CHEST 1 VIEW    CLINICAL HISTORY:  Disorientation, unspecified    FINDINGS:  Single view of the chest.  Aorta demonstrates atherosclerotic disease.    Cardiac silhouette is normal.  The lungs demonstrate no evidence of active disease.  No evidence of pleural effusion or pneumothorax.  Bones appear intact.                               CT Head Without Contrast (Final result)  Result time 04/27/19 10:52:56    Final result by Obi Walters MD (04/27/19 10:52:56)                 Impression:      No acute abnormality.    All CT scans at this facility use dose modulation, iterative reconstruction, and/or weight based dosing when appropriate to reduce radiation dose to as low as reasonable achievable.      Electronically signed by: Obi Walters MD  Date:    04/27/2019  Time:    10:52             Narrative:    EXAMINATION:  CT HEAD WITHOUT CONTRAST    CLINICAL HISTORY:  Confusion/delirium, altered LOC, unexplained;    TECHNIQUE:  Low dose axial CT images obtained throughout the head without intravenous contrast. Sagittal and coronal reconstructions were performed.    All CT scans at this facility use dose modulation, iterative reconstruction, and/or weight based dosing when appropriate to reduce radiation dose to as low as reasonable achievable.    COMPARISON:  None.    FINDINGS:  Intracranial compartment:    The brain parenchyma appears normal. No parenchymal mass, hemorrhage, edema or major vascular distribution  infarct.    Ventricles and sulci are normal in size for age without evidence of hydrocephalus.    No extra-axial blood or fluid collections.    Skull/extracranial contents (limited evaluation): No fracture.  Small polyp or retention cyst right maxillary sinus.  Mastoid air cells and paranasal sinuses are essentially clear.                                 The EKG was ordered, reviewed, and independently interpreted by the ED provider.  Interpretation time: 1031  Rate: 60 BPM  Rhythm: normal sinus rhythm  Interpretation: Low voltage QRS. Septal infarct. No STEMI.             The Emergency Provider reviewed the vital signs and test results, which are outlined above.     ED Discussion     12:33 PM: Reassessed pt at this time. Pt reports just PTA he fell asleep in his car with the windows up. Patient is awake, alert, and in NAD. Pt states his condition has improved at this time. Discussed with pt all pertinent ED information and results. Discussed pt dx and plan of tx. Gave pt all f/u and return to the ED instructions. All questions and concerns were addressed at this time. Pt expresses understanding of information and instructions, and is comfortable with plan to discharge. Pt is stable for discharge.    I discussed with patient and/or family/caretaker that evaluation in the ED does not suggest any emergent or life threatening medical conditions requiring immediate intervention beyond what was provided in the ED, and I believe patient is safe for discharge.  Regardless, an unremarkable evaluation in the ED does not preclude the development or presence of a serious of life threatening condition. As such, patient was instructed to return immediately for any worsening or change in current symptoms.    ED Medication(s):  Medications   sodium chloride 0.9% bolus 500 mL (has no administration in time range)   sodium chloride 0.9% bolus 500 mL (0 mLs Intravenous Stopped 4/27/19 1227)       New Prescriptions    No medications  on file                 Medical Decision Making:   Clinical Tests:   Lab Tests: Ordered and Reviewed  Radiological Study: Ordered and Reviewed  Medical Tests: Ordered and Reviewed             Scribe Attestation:   Scribe #1: I performed the above scribed service and the documentation accurately describes the services I performed. I attest to the accuracy of the note.     Attending:   Physician Attestation Statement for Scribe #1: I, Leonel Rao Jr., MD, personally performed the services described in this documentation, as scribed by Kiara Gan, in my presence, and it is both accurate and complete.           Clinical Impression       ICD-10-CM ICD-9-CM   1. Dehydration E86.0 276.51   2. Confusion R41.0 298.9   3. Chronic obstructive pulmonary disease, unspecified COPD type J44.9 496   4. Tobacco abuse Z72.0 305.1   5. Chronic hepatitis C without hepatic coma B18.2 070.54       Disposition:   Disposition: Discharged  Condition: Stable         Leonel Rao Jr., MD  04/27/19 0972

## 2019-04-27 NOTE — ED NOTES
Pt awake alert at this time talking and is oriented to place and situation speech is slow and pt states that he has had this problem several times before and just went to sleep and got better but it is now happing more frequently

## 2019-04-27 NOTE — ED NOTES
"Patient c/o feeling disoriented today while working on a car at home, stated "I went to go sit down because I felt disoriented, then when I woke up, I was in the house. I was talking to my daughter and they told me she wasn't there and that she's in [place]. But she was there."    Patient moved to ED room 16 via [facility] satindermickney, patient assisted onto stretcher and changed into a gown. Patient placed on cardiac monitor, continuous pulse oximetry and automatic blood pressure cuff. Bed placed in low locked position, side rails up x 2, call light is within reach of patient or family, orientation to room and explanation of wait provided to family and patient, alarms set and turned on for monitor and pulse ox, awaiting MD evaluation and orders, will continue to monitor.    Patient identifies self as Beltran A Bytamara.    LOC: The patient is awake, alert and aware of environment with an appropriate affect, the patient is oriented to person and speaking with slurred speech.  APPEARANCE: Patient resting comfortably and in no acute distress, patient's clothing is properly fastened.  SKIN: The skin is warm and dry, color consistent with ethnicity, patient has normal skin turgor and moist mucus membranes, skin intact, no breakdown or bruising noted.  MUSCULOSKELETAL: Patient moving all extremities well, no obvious swelling or deformities noted.  RESPIRATORY: Airway is open and patent, respirations are spontaneous, patient has a normal effort and rate, no accessory muscle use noted.  CARDIAC: Patient has a normal rate and rhythm, no periphreal edema noted, capillary refill < 3 seconds.  ABDOMEN: Soft and non tender to palpation, no distention noted.      Report given to SONY Cook. Care of patient transferred at this time.    "

## 2019-05-13 ENCOUNTER — LAB VISIT (OUTPATIENT)
Dept: LAB | Facility: HOSPITAL | Age: 59
End: 2019-05-13
Attending: FAMILY MEDICINE
Payer: MEDICARE

## 2019-05-13 ENCOUNTER — OFFICE VISIT (OUTPATIENT)
Dept: INTERNAL MEDICINE | Facility: CLINIC | Age: 59
End: 2019-05-13
Payer: MEDICARE

## 2019-05-13 VITALS
DIASTOLIC BLOOD PRESSURE: 74 MMHG | BODY MASS INDEX: 22.41 KG/M2 | HEIGHT: 70 IN | OXYGEN SATURATION: 97 % | WEIGHT: 156.5 LBS | SYSTOLIC BLOOD PRESSURE: 104 MMHG | TEMPERATURE: 98 F | HEART RATE: 72 BPM

## 2019-05-13 DIAGNOSIS — B18.2 CHRONIC HEPATITIS C WITHOUT HEPATIC COMA: Chronic | ICD-10-CM

## 2019-05-13 DIAGNOSIS — Z85.038 HISTORY OF COLON CANCER: Chronic | ICD-10-CM

## 2019-05-13 DIAGNOSIS — J44.9 CHRONIC OBSTRUCTIVE PULMONARY DISEASE, UNSPECIFIED COPD TYPE: Primary | Chronic | ICD-10-CM

## 2019-05-13 LAB
ALBUMIN SERPL BCP-MCNC: 3.9 G/DL (ref 3.5–5.2)
ALP SERPL-CCNC: 78 U/L (ref 55–135)
ALT SERPL W/O P-5'-P-CCNC: 69 U/L (ref 10–44)
AMMONIA PLAS-SCNC: 27 UMOL/L (ref 10–50)
ANION GAP SERPL CALC-SCNC: 7 MMOL/L (ref 8–16)
AST SERPL-CCNC: 54 U/L (ref 10–40)
BILIRUB SERPL-MCNC: 0.6 MG/DL (ref 0.1–1)
BUN SERPL-MCNC: 22 MG/DL (ref 6–20)
CALCIUM SERPL-MCNC: 9.3 MG/DL (ref 8.7–10.5)
CHLORIDE SERPL-SCNC: 104 MMOL/L (ref 95–110)
CO2 SERPL-SCNC: 26 MMOL/L (ref 23–29)
CREAT SERPL-MCNC: 0.8 MG/DL (ref 0.5–1.4)
EST. GFR  (AFRICAN AMERICAN): >60 ML/MIN/1.73 M^2
EST. GFR  (NON AFRICAN AMERICAN): >60 ML/MIN/1.73 M^2
GLUCOSE SERPL-MCNC: 83 MG/DL (ref 70–110)
POTASSIUM SERPL-SCNC: 4.3 MMOL/L (ref 3.5–5.1)
PROT SERPL-MCNC: 8.3 G/DL (ref 6–8.4)
SODIUM SERPL-SCNC: 137 MMOL/L (ref 136–145)

## 2019-05-13 PROCEDURE — 3008F BODY MASS INDEX DOCD: CPT | Mod: HCNC,CPTII,S$GLB, | Performed by: FAMILY MEDICINE

## 2019-05-13 PROCEDURE — 99214 PR OFFICE/OUTPT VISIT, EST, LEVL IV, 30-39 MIN: ICD-10-PCS | Mod: HCNC,S$GLB,, | Performed by: FAMILY MEDICINE

## 2019-05-13 PROCEDURE — 99499 RISK ADDL DX/OHS AUDIT: ICD-10-PCS | Mod: S$GLB,,, | Performed by: FAMILY MEDICINE

## 2019-05-13 PROCEDURE — 99214 OFFICE O/P EST MOD 30 MIN: CPT | Mod: HCNC,S$GLB,, | Performed by: FAMILY MEDICINE

## 2019-05-13 PROCEDURE — 99999 PR PBB SHADOW E&M-EST. PATIENT-LVL III: CPT | Mod: PBBFAC,HCNC,, | Performed by: FAMILY MEDICINE

## 2019-05-13 PROCEDURE — 99499 UNLISTED E&M SERVICE: CPT | Mod: S$GLB,,, | Performed by: FAMILY MEDICINE

## 2019-05-13 PROCEDURE — 99999 PR PBB SHADOW E&M-EST. PATIENT-LVL III: ICD-10-PCS | Mod: PBBFAC,HCNC,, | Performed by: FAMILY MEDICINE

## 2019-05-13 PROCEDURE — 82140 ASSAY OF AMMONIA: CPT | Mod: HCNC

## 2019-05-13 PROCEDURE — 36415 COLL VENOUS BLD VENIPUNCTURE: CPT | Mod: HCNC

## 2019-05-13 PROCEDURE — 80053 COMPREHEN METABOLIC PANEL: CPT | Mod: HCNC

## 2019-05-13 PROCEDURE — 3008F PR BODY MASS INDEX (BMI) DOCUMENTED: ICD-10-PCS | Mod: HCNC,CPTII,S$GLB, | Performed by: FAMILY MEDICINE

## 2019-05-13 RX ORDER — BUPRENORPHINE AND NALOXONE 8; 2 MG/1; MG/1
FILM, SOLUBLE BUCCAL; SUBLINGUAL
COMMUNITY
Start: 2018-07-23

## 2019-05-13 RX ORDER — ALBUTEROL SULFATE 90 UG/1
2 AEROSOL, METERED RESPIRATORY (INHALATION) EVERY 6 HOURS PRN
Qty: 1 INHALER | Refills: 11 | Status: SHIPPED | OUTPATIENT
Start: 2019-05-13 | End: 2020-05-12

## 2019-05-13 RX ORDER — CLONAZEPAM 0.5 MG/1
2 TABLET ORAL DAILY
Refills: 1 | COMMUNITY
Start: 2019-05-01

## 2019-05-13 NOTE — PROGRESS NOTES
Subjective:       Patient ID: Beltran Cruz is a 58 y.o. male.    Chief Complaint: Hospital Follow Up    Pt is a 58 year who is here after being seen in the ER after feeling confused. Pt labs and radiology work up was normal.  Pt was exertional and may have been short of breat.    Review of Systems   Constitutional: Negative.    Respiratory: Negative.    Cardiovascular: Negative.    Gastrointestinal: Negative.    Genitourinary: Negative.    Musculoskeletal: Negative.    Neurological: Negative.        Objective:      Physical Exam   Constitutional: He is oriented to person, place, and time. He appears well-developed and well-nourished.   Cardiovascular: Normal rate and regular rhythm. Exam reveals no friction rub.   No murmur heard.  Pulmonary/Chest: Effort normal and breath sounds normal. No stridor. He has no wheezes.   Abdominal: Soft. Bowel sounds are normal.   Musculoskeletal: Normal range of motion.   Neurological: He is alert and oriented to person, place, and time.   Skin: Skin is warm and dry.       Assessment:       1. Chronic obstructive pulmonary disease, unspecified COPD type    2. History of colon cancer    3. Chronic hepatitis C without hepatic coma        Plan:       Chronic obstructive pulmonary disease, unspecified COPD type    History of colon cancer    Chronic hepatitis C without hepatic coma  -     Comprehensive metabolic panel; Future; Expected date: 05/13/2019  -     Ammonia; Future; Expected date: 05/13/2019  -     Urinalysis; Future; Expected date: 05/13/2019  -     Urine culture; Future; Expected date: 05/13/2019    Other orders  -     albuterol (PROVENTIL/VENTOLIN HFA) 90 mcg/actuation inhaler; Inhale 2 puffs into the lungs every 6 (six) hours as needed for Wheezing. Dispense with spacer.  Dispense: 1 Inhaler; Refill: 11

## 2019-05-16 ENCOUNTER — TELEPHONE (OUTPATIENT)
Dept: INTERNAL MEDICINE | Facility: CLINIC | Age: 59
End: 2019-05-16

## 2019-05-16 NOTE — TELEPHONE ENCOUNTER
----- Message from Caryn Zacarias sent at 5/16/2019 11:22 AM CDT -----  Contact: PATIENT  Type:  Patient Returning Call    Who Called:PATIENT  Who Left Message for Patient:NURSEW  Does the patient know what this is regarding?:BLOOD WORK RESULTS  Would the patient rather a call back or a response via [facility]? CALL  Best Call Back Number:[phone]  Additional Information: PLEASE CALL BACK. THANKS, SHELLI

## 2019-11-13 ENCOUNTER — TELEPHONE (OUTPATIENT)
Dept: INTERNAL MEDICINE | Facility: CLINIC | Age: 59
End: 2019-11-13

## 2020-01-15 ENCOUNTER — TELEPHONE (OUTPATIENT)
Dept: GASTROENTEROLOGY | Facility: CLINIC | Age: 60
End: 2020-01-15

## 2020-01-15 ENCOUNTER — OFFICE VISIT (OUTPATIENT)
Dept: INTERNAL MEDICINE | Facility: CLINIC | Age: 60
End: 2020-01-15
Payer: MEDICARE

## 2020-01-15 ENCOUNTER — LAB VISIT (OUTPATIENT)
Dept: LAB | Facility: HOSPITAL | Age: 60
End: 2020-01-15
Attending: FAMILY MEDICINE
Payer: MEDICARE

## 2020-01-15 VITALS
DIASTOLIC BLOOD PRESSURE: 82 MMHG | BODY MASS INDEX: 20.48 KG/M2 | OXYGEN SATURATION: 98 % | HEART RATE: 80 BPM | WEIGHT: 143.06 LBS | TEMPERATURE: 98 F | SYSTOLIC BLOOD PRESSURE: 122 MMHG | HEIGHT: 70 IN

## 2020-01-15 DIAGNOSIS — B18.2 CHRONIC HEPATITIS C WITHOUT HEPATIC COMA: ICD-10-CM

## 2020-01-15 DIAGNOSIS — C18.4 MALIGNANT NEOPLASM OF TRANSVERSE COLON: ICD-10-CM

## 2020-01-15 DIAGNOSIS — Z00.00 ANNUAL PHYSICAL EXAM: ICD-10-CM

## 2020-01-15 DIAGNOSIS — Z12.11 ENCOUNTER FOR SCREENING COLONOSCOPY: ICD-10-CM

## 2020-01-15 DIAGNOSIS — J44.9 CHRONIC OBSTRUCTIVE PULMONARY DISEASE, UNSPECIFIED COPD TYPE: ICD-10-CM

## 2020-01-15 DIAGNOSIS — F11.20 UNCOMPLICATED OPIOID DEPENDENCE: ICD-10-CM

## 2020-01-15 DIAGNOSIS — Z00.00 ANNUAL PHYSICAL EXAM: Primary | ICD-10-CM

## 2020-01-15 LAB
ALBUMIN SERPL BCP-MCNC: 4.1 G/DL (ref 3.5–5.2)
ALP SERPL-CCNC: 72 U/L (ref 55–135)
ALT SERPL W/O P-5'-P-CCNC: 150 U/L (ref 10–44)
ANION GAP SERPL CALC-SCNC: 7 MMOL/L (ref 8–16)
AST SERPL-CCNC: 125 U/L (ref 10–40)
BASOPHILS # BLD AUTO: 0.06 K/UL (ref 0–0.2)
BASOPHILS NFR BLD: 1.2 % (ref 0–1.9)
BILIRUB SERPL-MCNC: 0.8 MG/DL (ref 0.1–1)
BUN SERPL-MCNC: 16 MG/DL (ref 8–23)
CALCIUM SERPL-MCNC: 9.7 MG/DL (ref 8.7–10.5)
CEA SERPL-MCNC: 1.4 NG/ML (ref 0–5)
CHLORIDE SERPL-SCNC: 103 MMOL/L (ref 95–110)
CHOLEST SERPL-MCNC: 160 MG/DL (ref 120–199)
CHOLEST/HDLC SERPL: 3.6 {RATIO} (ref 2–5)
CO2 SERPL-SCNC: 29 MMOL/L (ref 23–29)
COMPLEXED PSA SERPL-MCNC: 0.19 NG/ML (ref 0–4)
CREAT SERPL-MCNC: 1 MG/DL (ref 0.5–1.4)
DIFFERENTIAL METHOD: ABNORMAL
EOSINOPHIL # BLD AUTO: 0.1 K/UL (ref 0–0.5)
EOSINOPHIL NFR BLD: 2.2 % (ref 0–8)
ERYTHROCYTE [DISTWIDTH] IN BLOOD BY AUTOMATED COUNT: 13.4 % (ref 11.5–14.5)
EST. GFR  (AFRICAN AMERICAN): >60 ML/MIN/1.73 M^2
EST. GFR  (NON AFRICAN AMERICAN): >60 ML/MIN/1.73 M^2
GLUCOSE SERPL-MCNC: 80 MG/DL (ref 70–110)
HCT VFR BLD AUTO: 47 % (ref 40–54)
HDLC SERPL-MCNC: 44 MG/DL (ref 40–75)
HDLC SERPL: 27.5 % (ref 20–50)
HGB BLD-MCNC: 14.7 G/DL (ref 14–18)
IMM GRANULOCYTES # BLD AUTO: 0.01 K/UL (ref 0–0.04)
IMM GRANULOCYTES NFR BLD AUTO: 0.2 % (ref 0–0.5)
LDLC SERPL CALC-MCNC: 103.8 MG/DL (ref 63–159)
LYMPHOCYTES # BLD AUTO: 2.3 K/UL (ref 1–4.8)
LYMPHOCYTES NFR BLD: 47.2 % (ref 18–48)
MCH RBC QN AUTO: 28.4 PG (ref 27–31)
MCHC RBC AUTO-ENTMCNC: 31.3 G/DL (ref 32–36)
MCV RBC AUTO: 91 FL (ref 82–98)
MONOCYTES # BLD AUTO: 0.6 K/UL (ref 0.3–1)
MONOCYTES NFR BLD: 11.5 % (ref 4–15)
NEUTROPHILS # BLD AUTO: 1.8 K/UL (ref 1.8–7.7)
NEUTROPHILS NFR BLD: 37.7 % (ref 38–73)
NONHDLC SERPL-MCNC: 116 MG/DL
NRBC BLD-RTO: 0 /100 WBC
PLATELET # BLD AUTO: 258 K/UL (ref 150–350)
PMV BLD AUTO: 10.5 FL (ref 9.2–12.9)
POTASSIUM SERPL-SCNC: 4.1 MMOL/L (ref 3.5–5.1)
PROT SERPL-MCNC: 8.7 G/DL (ref 6–8.4)
RBC # BLD AUTO: 5.18 M/UL (ref 4.6–6.2)
SODIUM SERPL-SCNC: 139 MMOL/L (ref 136–145)
TRIGL SERPL-MCNC: 61 MG/DL (ref 30–150)
WBC # BLD AUTO: 4.89 K/UL (ref 3.9–12.7)

## 2020-01-15 PROCEDURE — 82378 CARCINOEMBRYONIC ANTIGEN: CPT | Mod: HCNC

## 2020-01-15 PROCEDURE — 80061 LIPID PANEL: CPT | Mod: HCNC

## 2020-01-15 PROCEDURE — 99396 PREV VISIT EST AGE 40-64: CPT | Mod: HCNC,S$GLB,, | Performed by: FAMILY MEDICINE

## 2020-01-15 PROCEDURE — 84153 ASSAY OF PSA TOTAL: CPT | Mod: HCNC

## 2020-01-15 PROCEDURE — 99999 PR PBB SHADOW E&M-EST. PATIENT-LVL III: CPT | Mod: PBBFAC,HCNC,, | Performed by: FAMILY MEDICINE

## 2020-01-15 PROCEDURE — 85025 COMPLETE CBC W/AUTO DIFF WBC: CPT | Mod: HCNC

## 2020-01-15 PROCEDURE — 99396 PR PREVENTIVE VISIT,EST,40-64: ICD-10-PCS | Mod: HCNC,S$GLB,, | Performed by: FAMILY MEDICINE

## 2020-01-15 PROCEDURE — 99999 PR PBB SHADOW E&M-EST. PATIENT-LVL III: ICD-10-PCS | Mod: PBBFAC,HCNC,, | Performed by: FAMILY MEDICINE

## 2020-01-15 PROCEDURE — 80053 COMPREHEN METABOLIC PANEL: CPT | Mod: HCNC

## 2020-01-15 PROCEDURE — 36415 COLL VENOUS BLD VENIPUNCTURE: CPT | Mod: HCNC

## 2020-01-15 PROCEDURE — 87522 HEPATITIS C REVRS TRNSCRPJ: CPT | Mod: HCNC

## 2020-01-15 NOTE — PROGRESS NOTES
Subjective:       Patient ID: Beltran Cruz is a 59 y.o. male.    Chief Complaint: Follow-up    F/U:      Pt is a 59 year old had Hep C and Colon Cancer. Pt has not followed up with GI or Oncology. Pt is still smoking. Pt is smoking about 10 cig a day.    Review of Systems   Constitutional: Negative.    HENT: Negative.    Respiratory: Negative.    Cardiovascular: Negative.    Gastrointestinal: Negative.    Genitourinary: Negative.    Musculoskeletal: Negative.    Skin: Negative.    Hematological: Negative.    Psychiatric/Behavioral: Negative.  Negative for decreased concentration and dysphoric mood. The patient is not nervous/anxious.        Objective:      Physical Exam   Constitutional: He is oriented to person, place, and time. He appears well-developed and well-nourished.   Cardiovascular: Normal rate and regular rhythm. Exam reveals no friction rub.   No murmur heard.  Pulmonary/Chest: Effort normal and breath sounds normal. No stridor. He has no wheezes.   Abdominal: Soft. Bowel sounds are normal. There is no tenderness. There is no guarding.   Neurological: He is alert and oriented to person, place, and time.   Psychiatric: He has a normal mood and affect. His behavior is normal.       Assessment:       1. Annual physical exam    2. Malignant neoplasm of transverse colon    3. Chronic hepatitis C without hepatic coma    4. Uncomplicated opioid dependence    5. Chronic obstructive pulmonary disease, unspecified COPD type    6. Encounter for screening colonoscopy        Plan:       Annual physical exam  Comments:  Will do CBC, CMP, Lipid  Orders:  -     CBC auto differential; Future; Expected date: 01/15/2020  -     Comprehensive metabolic panel; Future; Expected date: 01/15/2020  -     Lipid panel; Future; Expected date: 01/15/2020  -     PSA, Screening; Future; Expected date: 01/15/2020    Malignant neoplasm of transverse colon  Comments:  Will get a CEA and send back to Oncology  Orders:  -     CEA; Future;  Expected date: 01/15/2020    Chronic hepatitis C without hepatic coma  Comments:  Will RNA Hep C and send to GI  Orders:  -     HEPATITIS C RNA, QUANTITATIVE, PCR; Future; Expected date: 01/15/2020    Uncomplicated opioid dependence  Comments:  Stable    Chronic obstructive pulmonary disease, unspecified COPD type  Comments:  Will continue on as needed Albuterol    Encounter for screening colonoscopy  Comments:  colonoscopy screen  Orders:  -     Case request GI: COLONOSCOPY

## 2020-01-15 NOTE — TELEPHONE ENCOUNTER
----- Message from Leno Arnold sent at 1/15/2020 10:48 AM CST -----  Contact: Pt   Pt called in regards to rescheduling appointment. Pt can be reached at [phone].

## 2020-01-16 ENCOUNTER — TELEPHONE (OUTPATIENT)
Dept: ENDOSCOPY | Facility: HOSPITAL | Age: 60
End: 2020-01-16

## 2020-01-16 NOTE — TELEPHONE ENCOUNTER

## 2020-01-18 LAB
HCV RNA SERPL NAA+PROBE-LOG IU: 4.69 LOG (10) IU/ML
HCV RNA SERPL QL NAA+PROBE: DETECTED IU/ML
HCV RNA SPEC NAA+PROBE-ACNC: ABNORMAL IU/ML

## 2020-01-23 ENCOUNTER — OFFICE VISIT (OUTPATIENT)
Dept: HEMATOLOGY/ONCOLOGY | Facility: CLINIC | Age: 60
End: 2020-01-23
Payer: MEDICARE

## 2020-01-23 VITALS
HEIGHT: 70 IN | OXYGEN SATURATION: 98 % | SYSTOLIC BLOOD PRESSURE: 117 MMHG | DIASTOLIC BLOOD PRESSURE: 74 MMHG | RESPIRATION RATE: 18 BRPM | HEART RATE: 63 BPM | BODY MASS INDEX: 21.14 KG/M2 | WEIGHT: 147.69 LBS | TEMPERATURE: 98 F

## 2020-01-23 DIAGNOSIS — C18.4 MALIGNANT NEOPLASM OF TRANSVERSE COLON: Primary | Chronic | ICD-10-CM

## 2020-01-23 DIAGNOSIS — B18.2 CHRONIC HEPATITIS C WITHOUT HEPATIC COMA: Chronic | ICD-10-CM

## 2020-01-23 DIAGNOSIS — Z72.0 TOBACCO ABUSE: ICD-10-CM

## 2020-01-23 DIAGNOSIS — F17.200 NEEDS SMOKING CESSATION EDUCATION: ICD-10-CM

## 2020-01-23 DIAGNOSIS — D49.89 NEOPLASM OF ABDOMEN: ICD-10-CM

## 2020-01-23 PROCEDURE — 99215 OFFICE O/P EST HI 40 MIN: CPT | Mod: HCNC,S$GLB,, | Performed by: INTERNAL MEDICINE

## 2020-01-23 PROCEDURE — 3008F BODY MASS INDEX DOCD: CPT | Mod: HCNC,CPTII,S$GLB, | Performed by: INTERNAL MEDICINE

## 2020-01-23 PROCEDURE — 99999 PR PBB SHADOW E&M-EST. PATIENT-LVL IV: CPT | Mod: PBBFAC,HCNC,, | Performed by: INTERNAL MEDICINE

## 2020-01-23 PROCEDURE — 99999 PR PBB SHADOW E&M-EST. PATIENT-LVL IV: ICD-10-PCS | Mod: PBBFAC,HCNC,, | Performed by: INTERNAL MEDICINE

## 2020-01-23 PROCEDURE — 99215 PR OFFICE/OUTPT VISIT, EST, LEVL V, 40-54 MIN: ICD-10-PCS | Mod: HCNC,S$GLB,, | Performed by: INTERNAL MEDICINE

## 2020-01-23 PROCEDURE — 3008F PR BODY MASS INDEX (BMI) DOCUMENTED: ICD-10-PCS | Mod: HCNC,CPTII,S$GLB, | Performed by: INTERNAL MEDICINE

## 2020-01-23 NOTE — PROGRESS NOTES
Subjective:       Patient ID: Beltran Cruz is a 59 y.o. male.    Chief Complaint: Results and Colon Cancer    HPI 59-year-old male history of stage I colon cancer 2017 patient reports treatment active hepatitis treated in the past.  Scheduled to see GI in the next several days patient continues to actively smoke    Past Medical History:   Diagnosis Date    Back pain     Cancer     Colon    Chronic hepatitis C     Chronic obstructive pulmonary disease 8/9/2016    Colon cancer 10/2016    T1 N0    Colon polyps     Colonoscopy 9/6/2016    Diverticulosis     Colonoscopy 9/6/2016    Drug overdose, intentional 03/22/2008    benzo,opiates, alcohol, asa , acetomenopjen    Hemorrhoids     Colonoscopy 9/6/2016    Screening PSA (prostate specific antigen) 8/24/2016     History reviewed. No pertinent family history.  Social History     Socioeconomic History    Marital status:      Spouse name: Not on file    Number of children: Not on file    Years of education: Not on file    Highest education level: Not on file   Occupational History    Not on file   Social Needs    Financial resource strain: Not on file    Food insecurity:     Worry: Not on file     Inability: Not on file    Transportation needs:     Medical: Not on file     Non-medical: Not on file   Tobacco Use    Smoking status: Current Every Day Smoker     Packs/day: 1.00     Years: 42.00     Pack years: 42.00     Types: Cigarettes    Smokeless tobacco: Never Used    Tobacco comment: no smoking after 12 midnight prior to surgery   Substance and Sexual Activity    Alcohol use: Yes     Comment: social    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    Sexual activity: Not on file   Lifestyle    Physical activity:     Days per week: Not on file     Minutes per session: Not on file    Stress: Not on file   Relationships    Social connections:     Talks on phone: Not on file     Gets together: Not on file     Attends [employer] service: Not  on file     Active member of club or organization: Not on file     Attends meetings of clubs or organizations: Not on file     Relationship status: Not on file   Other Topics Concern    Not on file   Social History Narrative    Not on file     Past Surgical History:   Procedure Laterality Date    ANKLE SURGERY      APPENDECTOMY      BACK SURGERY      COLONOSCOPY N/A 9/6/2016    Procedure: COLONOSCOPY;  Surgeon: Jimbo Farrell MD;  Location: [facility];  Service: Endoscopy;  Laterality: N/A;    COLONOSCOPY N/A 7/17/2017    Procedure: COLONOSCOPY;  Surgeon: Jimbo Farrell MD;  Location: [facility];  Service: Endoscopy;  Laterality: N/A;    HEMICOLECTOMY  10/4/ 2016    laprascopic for colon ca    SHOULDER SURGERY         Labs:  Lab Results   Component Value Date    WBC 4.89 01/15/2020    HGB 14.7 01/15/2020    HCT 47.0 01/15/2020    MCV 91 01/15/2020     01/15/2020     BMP  Lab Results   Component Value Date     01/15/2020    K 4.1 01/15/2020     01/15/2020    CO2 29 01/15/2020    BUN 16 01/15/2020    CREATININE 1.0 01/15/2020    CALCIUM 9.7 01/15/2020    ANIONGAP 7 (L) 01/15/2020    ESTGFRAFRICA >60.0 01/15/2020    EGFRNONAA >60.0 01/15/2020     Lab Results   Component Value Date     (H) 01/15/2020     (H) 01/15/2020    GGT 39 08/31/2016    ALKPHOS 72 01/15/2020    BILITOT 0.8 01/15/2020       No results found for: IRON, TIBC, FERRITIN, SATURATEDIRO  No results found for: CYPCHGEV51  No results found for: FOLATE  No results found for: TSH      Review of Systems   Constitutional: Negative for activity change, appetite change, chills, diaphoresis, fatigue, fever and unexpected weight change.   HENT: Negative for congestion, dental problem, drooling, ear discharge, ear pain, facial swelling, hearing loss, mouth sores, nosebleeds, postnasal drip, rhinorrhea, sinus pressure, sneezing, sore throat, tinnitus, trouble swallowing and voice change.    Eyes: Negative for  photophobia, pain, discharge, redness, itching and visual disturbance.   Respiratory: Negative for apnea, cough, choking, chest tightness, shortness of breath, wheezing and stridor.    Cardiovascular: Negative for chest pain, palpitations and leg swelling.   Gastrointestinal: Negative for abdominal distention, abdominal pain, anal bleeding, blood in stool, constipation, diarrhea, nausea, rectal pain and vomiting.   Endocrine: Negative for cold intolerance, heat intolerance, polydipsia, polyphagia and polyuria.   Genitourinary: Negative for decreased urine volume, difficulty urinating, discharge, dysuria, enuresis, flank pain, frequency, genital sores, hematuria, penile pain, penile swelling, scrotal swelling, testicular pain and urgency.   Musculoskeletal: Negative for arthralgias, back pain, gait problem, joint swelling, myalgias, neck pain and neck stiffness.   Skin: Negative for color change, pallor, rash and wound.   Allergic/Immunologic: Negative for environmental allergies, food allergies and immunocompromised state.   Neurological: Positive for weakness. Negative for dizziness, tremors, seizures, syncope, facial asymmetry, speech difficulty, light-headedness, numbness and headaches.   Hematological: Negative for adenopathy. Does not bruise/bleed easily.   Psychiatric/Behavioral: Positive for dysphoric mood. Negative for agitation, behavioral problems, confusion, decreased concentration, hallucinations, self-injury, sleep disturbance and suicidal ideas. The patient is nervous/anxious. The patient is not hyperactive.        Objective:      Physical Exam   Constitutional: He is oriented to person, place, and time. He appears well-developed and well-nourished. He appears distressed.   HENT:   Head: Normocephalic.   Right Ear: External ear normal.   Left Ear: External ear normal.   Nose: Nose normal. Right sinus exhibits no maxillary sinus tenderness and no frontal sinus tenderness. Left sinus exhibits no  maxillary sinus tenderness and no frontal sinus tenderness.   Mouth/Throat: Oropharynx is clear and moist. No oropharyngeal exudate.   Eyes: Pupils are equal, round, and reactive to light. EOM and lids are normal. Right eye exhibits no discharge. Left eye exhibits no discharge. Right conjunctiva is not injected. Right conjunctiva has no hemorrhage. Left conjunctiva is not injected. Left conjunctiva has no hemorrhage. No scleral icterus. Right eye exhibits normal extraocular motion. Left eye exhibits normal extraocular motion.   Neck: Normal range of motion. Neck supple. No JVD present. No tracheal deviation present. No thyromegaly present.   Cardiovascular: Normal rate and regular rhythm.   Pulmonary/Chest: Effort normal. No stridor. No respiratory distress.   Abdominal: Soft. He exhibits no mass. There is no hepatosplenomegaly, splenomegaly or hepatomegaly. There is no tenderness.   Musculoskeletal: Normal range of motion. He exhibits no edema or tenderness.   Lymphadenopathy:        Head (right side): No posterior auricular and no occipital adenopathy present.        Head (left side): No posterior auricular and no occipital adenopathy present.     He has no cervical adenopathy.        Right cervical: No superficial cervical, no deep cervical and no posterior cervical adenopathy present.       Left cervical: No superficial cervical, no deep cervical and no posterior cervical adenopathy present.     He has no axillary adenopathy.        Right: No supraclavicular adenopathy present.        Left: No supraclavicular adenopathy present.   Neurological: He is alert and oriented to person, place, and time. He has normal strength. No cranial nerve deficit. Coordination normal.   Skin: Skin is dry. No rash noted. He is not diaphoretic. No cyanosis or erythema. Nails show no clubbing.   Psychiatric: He has a normal mood and affect. His behavior is normal. Judgment and thought content normal. Cognition and memory are normal.    Vitals reviewed.          Assessment:      1. Malignant neoplasm of transverse colon    2. Tobacco abuse    3. Neoplasm of abdomen    4. Needs smoking cessation education           Plan:     Referral made to smoking cessation program will proceed with CT chest abdomen pelvis follow-up colon cancer low CEA.  Patient will return after was for review.  Scheduled for evaluation by GI for treatment for hepatitis C and will discuss in further follow-up last seen more than 1 year ago total time spent to face-to-face discussed with family discussed indications and treatment recommendations 40 min face-to-face time greater than 50% time face-to-face with family        Jorge Avendano Jr, MD FACP

## 2020-01-30 ENCOUNTER — TELEPHONE (OUTPATIENT)
Dept: RADIOLOGY | Facility: HOSPITAL | Age: 60
End: 2020-01-30

## 2020-01-31 ENCOUNTER — OFFICE VISIT (OUTPATIENT)
Dept: OPHTHALMOLOGY | Facility: CLINIC | Age: 60
End: 2020-01-31
Payer: MEDICARE

## 2020-01-31 DIAGNOSIS — H20.9 IRITIS, TRAUMATIC: Primary | ICD-10-CM

## 2020-01-31 PROCEDURE — 99999 PR PBB SHADOW E&M-EST. PATIENT-LVL I: CPT | Mod: PBBFAC,HCNC,, | Performed by: OPTOMETRIST

## 2020-01-31 PROCEDURE — 92004 PR EYE EXAM, NEW PATIENT,COMPREHESV: ICD-10-PCS | Mod: HCNC,S$GLB,, | Performed by: OPTOMETRIST

## 2020-01-31 PROCEDURE — 99999 PR PBB SHADOW E&M-EST. PATIENT-LVL I: ICD-10-PCS | Mod: PBBFAC,HCNC,, | Performed by: OPTOMETRIST

## 2020-01-31 PROCEDURE — 92004 COMPRE OPH EXAM NEW PT 1/>: CPT | Mod: HCNC,S$GLB,, | Performed by: OPTOMETRIST

## 2020-01-31 RX ORDER — ATROPINE SULFATE 10 MG/ML
1 SOLUTION/ DROPS OPHTHALMIC 2 TIMES DAILY
Qty: 1 BOTTLE | Refills: 3 | Status: SHIPPED | OUTPATIENT
Start: 2020-01-31

## 2020-01-31 RX ORDER — PREDNISOLONE ACETATE 10 MG/ML
1 SUSPENSION/ DROPS OPHTHALMIC
Qty: 10 ML | Refills: 3 | Status: SHIPPED | OUTPATIENT
Start: 2020-01-31 | End: 2020-05-18

## 2020-01-31 NOTE — PROGRESS NOTES
HPI     Spring from sofa popped in right eye last night.  Patient when to ER last night at [facility] in [place] was told he has a abrasion   right eye.  Right eye pain, swollen,sensitive to light, feels like something is stuck   in eye.  Pain scale 6.  New patient last eye exam 40 years.    Last edited by Jalen Alan, ROMERO on 1/31/2020  2:59 PM. (History)            Assessment /Plan     For exam results, see Encounter Report.    Iritis, traumatic  -     atropine 1% (ISOPTO ATROPINE) 1 % Drop; Place 1 drop into both eyes 2 (two) times daily.  Dispense: 1 Bottle; Refill: 3  -     prednisoLONE acetate (PRED FORTE) 1 % DrpS; Place 1 drop into the right eye every 2 (two) hours.  Dispense: 10 mL; Refill: 3      Emycin stacy prn foreign body sensation.    Atropine bid OD    PF q2h while awake OD    Consult [facility] retinal eval. On Monday

## 2020-02-24 ENCOUNTER — TELEPHONE (OUTPATIENT)
Dept: HEMATOLOGY/ONCOLOGY | Facility: CLINIC | Age: 60
End: 2020-02-24

## 2020-02-24 ENCOUNTER — TELEPHONE (OUTPATIENT)
Dept: FAMILY MEDICINE | Facility: CLINIC | Age: 60
End: 2020-02-24

## 2020-02-24 NOTE — TELEPHONE ENCOUNTER
Spoke to the patient I have rescheduled his scans and follow up appt . Patient verbalized his understanding of all appts given by then nurse.

## 2020-02-24 NOTE — TELEPHONE ENCOUNTER
Attempted to reach the patient in regards to a message sent by him he had sent to reschedule his canceled appts. Patient did not answer and their was no voicemail set up to leave a message.

## 2020-02-24 NOTE — TELEPHONE ENCOUNTER
----- Message from Tricia Holbrook sent at 2/24/2020  8:04 AM CST -----  Contact: self  Requesting call back regarding getting appt r/s for a Tuesday or Wednesday. Please call back at [phone].    Thanks,  Tricia Holbrook

## 2020-02-24 NOTE — TELEPHONE ENCOUNTER
Spoke with patient and rescheduled procedure from 3- to 3- d/t transportation concerns.  Instructions mailed to address on file and patient verbalized understanding.

## 2020-03-06 ENCOUNTER — HOSPITAL ENCOUNTER (OUTPATIENT)
Dept: RADIOLOGY | Facility: HOSPITAL | Age: 60
Discharge: HOME | End: 2020-03-06
Attending: INTERNAL MEDICINE
Payer: MEDICARE

## 2020-03-06 ENCOUNTER — OFFICE VISIT (OUTPATIENT)
Dept: HEMATOLOGY/ONCOLOGY | Facility: CLINIC | Age: 60
End: 2020-03-06
Payer: MEDICARE

## 2020-03-06 VITALS
DIASTOLIC BLOOD PRESSURE: 78 MMHG | TEMPERATURE: 97 F | OXYGEN SATURATION: 95 % | SYSTOLIC BLOOD PRESSURE: 114 MMHG | BODY MASS INDEX: 21.59 KG/M2 | WEIGHT: 150.81 LBS | RESPIRATION RATE: 20 BRPM | HEIGHT: 70 IN | HEART RATE: 71 BPM

## 2020-03-06 DIAGNOSIS — D49.89 NEOPLASM OF ABDOMEN: ICD-10-CM

## 2020-03-06 DIAGNOSIS — C18.4 MALIGNANT NEOPLASM OF TRANSVERSE COLON: Primary | ICD-10-CM

## 2020-03-06 DIAGNOSIS — C80.1 MALIGNANCY: Primary | ICD-10-CM

## 2020-03-06 DIAGNOSIS — C18.4 MALIGNANT NEOPLASM OF TRANSVERSE COLON: Chronic | ICD-10-CM

## 2020-03-06 DIAGNOSIS — Z72.0 TOBACCO ABUSE: Chronic | ICD-10-CM

## 2020-03-06 DIAGNOSIS — C18.4 MALIGNANT NEOPLASM OF TRANSVERSE COLON: ICD-10-CM

## 2020-03-06 DIAGNOSIS — Z72.0 TOBACCO ABUSE: ICD-10-CM

## 2020-03-06 DIAGNOSIS — B18.2 CHRONIC HEPATITIS C WITHOUT HEPATIC COMA: Chronic | ICD-10-CM

## 2020-03-06 DIAGNOSIS — F17.200 NEEDS SMOKING CESSATION EDUCATION: ICD-10-CM

## 2020-03-06 PROCEDURE — 3008F BODY MASS INDEX DOCD: CPT | Mod: HCNC,CPTII,S$GLB, | Performed by: INTERNAL MEDICINE

## 2020-03-06 PROCEDURE — 25500020 PHARM REV CODE 255: Mod: HCNC | Performed by: INTERNAL MEDICINE

## 2020-03-06 PROCEDURE — 74177 CT ABD & PELVIS W/CONTRAST: CPT | Mod: TC,HCNC

## 2020-03-06 PROCEDURE — 3008F PR BODY MASS INDEX (BMI) DOCUMENTED: ICD-10-PCS | Mod: HCNC,CPTII,S$GLB, | Performed by: INTERNAL MEDICINE

## 2020-03-06 PROCEDURE — 99999 PR PBB SHADOW E&M-EST. PATIENT-LVL IV: CPT | Mod: PBBFAC,HCNC,, | Performed by: INTERNAL MEDICINE

## 2020-03-06 PROCEDURE — 99214 OFFICE O/P EST MOD 30 MIN: CPT | Mod: HCNC,S$GLB,, | Performed by: INTERNAL MEDICINE

## 2020-03-06 PROCEDURE — 99214 PR OFFICE/OUTPT VISIT, EST, LEVL IV, 30-39 MIN: ICD-10-PCS | Mod: HCNC,S$GLB,, | Performed by: INTERNAL MEDICINE

## 2020-03-06 PROCEDURE — 99499 UNLISTED E&M SERVICE: CPT | Mod: S$GLB,,, | Performed by: INTERNAL MEDICINE

## 2020-03-06 PROCEDURE — 71260 CT THORAX DX C+: CPT | Mod: TC,HCNC

## 2020-03-06 PROCEDURE — 99999 PR PBB SHADOW E&M-EST. PATIENT-LVL IV: ICD-10-PCS | Mod: PBBFAC,HCNC,, | Performed by: INTERNAL MEDICINE

## 2020-03-06 PROCEDURE — 99499 RISK ADDL DX/OHS AUDIT: ICD-10-PCS | Mod: S$GLB,,, | Performed by: INTERNAL MEDICINE

## 2020-03-06 RX ADMIN — IOHEXOL 75 ML: 350 INJECTION, SOLUTION INTRAVENOUS at 11:03

## 2020-03-06 RX ADMIN — IOHEXOL 30 ML: 350 INJECTION, SOLUTION INTRAVENOUS at 09:03

## 2020-03-06 NOTE — PROGRESS NOTES
Subjective:       Patient ID: Beltran Cruz is a 59 y.o. male.    Chief Complaint: Results and Colon Cancer    HPI 59-year-old male stage I colon carcinoma returns for follow-up with repeat laboratory studies and imaging previous history of hepatitis-C continues to actively smoke ECOG status 1    Past Medical History:   Diagnosis Date    Back pain     Cancer     Colon    Chronic hepatitis C     Chronic obstructive pulmonary disease 8/9/2016    Colon cancer 10/2016    T1 N0    Colon polyps     Colonoscopy 9/6/2016    Diverticulosis     Colonoscopy 9/6/2016    Drug overdose, intentional 03/22/2008    benzo,opiates, alcohol, asa , acetomenopjen    Hemorrhoids     Colonoscopy 9/6/2016    Screening PSA (prostate specific antigen) 8/24/2016     Family History   Problem Relation Age of Onset    Retinal detachment Brother     Hypertension Brother      Social History     Socioeconomic History    Marital status:      Spouse name: Not on file    Number of children: Not on file    Years of education: Not on file    Highest education level: Not on file   Occupational History    Not on file   Social Needs    Financial resource strain: Not on file    Food insecurity:     Worry: Not on file     Inability: Not on file    Transportation needs:     Medical: Not on file     Non-medical: Not on file   Tobacco Use    Smoking status: Current Every Day Smoker     Packs/day: 1.00     Years: 42.00     Pack years: 42.00     Types: Cigarettes    Smokeless tobacco: Never Used    Tobacco comment: no smoking after 12 midnight prior to surgery   Substance and Sexual Activity    Alcohol use: Not Currently     Comment: social    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    Sexual activity: Not on file   Lifestyle    Physical activity:     Days per week: Not on file     Minutes per session: Not on file    Stress: Not on file   Relationships    Social connections:     Talks on phone: Not on file     Gets  together: Not on file     Attends Orthodox service: Not on file     Active member of club or organization: Not on file     Attends meetings of clubs or organizations: Not on file     Relationship status: Not on file   Other Topics Concern    Not on file   Social History Narrative    Not on file     Past Surgical History:   Procedure Laterality Date    ANKLE SURGERY      APPENDECTOMY      BACK SURGERY      COLONOSCOPY N/A 9/6/2016    Procedure: COLONOSCOPY;  Surgeon: Jimbo Farrell MD;  Location: [facility];  Service: Endoscopy;  Laterality: N/A;    COLONOSCOPY N/A 7/17/2017    Procedure: COLONOSCOPY;  Surgeon: Jimbo Farrell MD;  Location: [facility];  Service: Endoscopy;  Laterality: N/A;    HEMICOLECTOMY  10/4/ 2016    laprascopic for colon ca    SHOULDER SURGERY         Labs:  Lab Results   Component Value Date    WBC 4.89 01/15/2020    HGB 14.7 01/15/2020    HCT 47.0 01/15/2020    MCV 91 01/15/2020     01/15/2020     BMP  Lab Results   Component Value Date     01/15/2020    K 4.1 01/15/2020     01/15/2020    CO2 29 01/15/2020    BUN 16 01/15/2020    CREATININE 0.8 03/06/2020    CALCIUM 9.7 01/15/2020    ANIONGAP 7 (L) 01/15/2020    ESTGFRAFRICA >60 03/06/2020    EGFRNONAA >60 03/06/2020     Lab Results   Component Value Date     (H) 01/15/2020     (H) 01/15/2020    GGT 39 08/31/2016    ALKPHOS 72 01/15/2020    BILITOT 0.8 01/15/2020       No results found for: IRON, TIBC, FERRITIN, SATURATEDIRO  No results found for: [id number]  No results found for: FOLATE  No results found for: TSH      Review of Systems   Constitutional: Negative for activity change, appetite change, chills, diaphoresis, fatigue, fever and unexpected weight change.   HENT: Negative for congestion, dental problem, drooling, ear discharge, ear pain, facial swelling, hearing loss, mouth sores, nosebleeds, postnasal drip, rhinorrhea, sinus pressure, sneezing, sore throat, tinnitus, trouble  swallowing and voice change.    Eyes: Negative for photophobia, pain, discharge, redness, itching and visual disturbance.   Respiratory: Negative for apnea, cough, choking, chest tightness, shortness of breath, wheezing and stridor.    Cardiovascular: Negative for chest pain, palpitations and leg swelling.   Gastrointestinal: Negative for abdominal distention, abdominal pain, anal bleeding, blood in stool, constipation, diarrhea, nausea, rectal pain and vomiting.   Endocrine: Negative for cold intolerance, heat intolerance, polydipsia, polyphagia and polyuria.   Genitourinary: Negative for decreased urine volume, difficulty urinating, discharge, dysuria, enuresis, flank pain, frequency, genital sores, hematuria, penile pain, penile swelling, scrotal swelling, testicular pain and urgency.   Musculoskeletal: Negative for arthralgias, back pain, gait problem, joint swelling, myalgias, neck pain and neck stiffness.   Skin: Negative for color change, pallor, rash and wound.   Allergic/Immunologic: Negative for environmental allergies, food allergies and immunocompromised state.   Neurological: Negative for dizziness, tremors, seizures, syncope, facial asymmetry, speech difficulty, weakness, light-headedness, numbness and headaches.   Hematological: Negative for adenopathy. Does not bruise/bleed easily.   Psychiatric/Behavioral: Positive for dysphoric mood. Negative for agitation, behavioral problems, confusion, decreased concentration, hallucinations, self-injury, sleep disturbance and suicidal ideas. The patient is nervous/anxious. The patient is not hyperactive.        Objective:      Physical Exam   Constitutional: He is oriented to person, place, and time. He appears well-developed and well-nourished. He appears distressed.   HENT:   Head: Normocephalic.   Right Ear: External ear normal.   Left Ear: External ear normal.   Nose: Nose normal. Right sinus exhibits no maxillary sinus tenderness and no frontal sinus  tenderness. Left sinus exhibits no maxillary sinus tenderness and no frontal sinus tenderness.   Mouth/Throat: Oropharynx is clear and moist. No oropharyngeal exudate.   Eyes: Pupils are equal, round, and reactive to light. EOM and lids are normal. Right eye exhibits no discharge. Left eye exhibits no discharge. Right conjunctiva is not injected. Right conjunctiva has no hemorrhage. Left conjunctiva is not injected. Left conjunctiva has no hemorrhage. No scleral icterus. Right eye exhibits normal extraocular motion. Left eye exhibits normal extraocular motion.   Neck: Normal range of motion. Neck supple. No JVD present. No tracheal deviation present. No thyromegaly present.   Cardiovascular: Normal rate and regular rhythm.   Pulmonary/Chest: Effort normal. No stridor. No respiratory distress.   Abdominal: Soft. He exhibits no mass. There is no hepatosplenomegaly, splenomegaly or hepatomegaly. There is no tenderness.   Musculoskeletal: Normal range of motion. He exhibits no edema or tenderness.   Lymphadenopathy:        Head (right side): No posterior auricular and no occipital adenopathy present.        Head (left side): No posterior auricular and no occipital adenopathy present.     He has no cervical adenopathy.        Right cervical: No superficial cervical, no deep cervical and no posterior cervical adenopathy present.       Left cervical: No superficial cervical, no deep cervical and no posterior cervical adenopathy present.     He has no axillary adenopathy.        Right: No supraclavicular adenopathy present.        Left: No supraclavicular adenopathy present.   Neurological: He is alert and oriented to person, place, and time. He has normal strength. No cranial nerve deficit. Coordination normal.   Skin: Skin is dry. No rash noted. He is not diaphoretic. No cyanosis or erythema. Nails show no clubbing.   Psychiatric: He has a normal mood and affect. His behavior is normal. Judgment and thought content  normal. Cognition and memory are normal.   Vitals reviewed.          Assessment:      1. Malignant neoplasm of transverse colon    2. Neoplasm of abdomen    3. Chronic hepatitis C without hepatic coma    4. Tobacco abuse    5. Needs smoking cessation education           Plan:     History of stage I colon carcinoma returns with imaging studies CT chest abdomen pelvis reveals no evidence of malignancy.  Positive hepatitis-C will refer to GI Medicine for further evaluation last in 2017.  In addition recommend smoking cessation program cause of active smoking documented abdominal and it aortic aneurysm a 2 cm will return in follow-up in 1 year with repeat imaging studies in do continue to follow M you resume size        Jorge Avendano Jr, MD FACP

## 2020-03-09 ENCOUNTER — PATIENT OUTREACH (OUTPATIENT)
Dept: ADMINISTRATIVE | Facility: OTHER | Age: 60
End: 2020-03-09

## 2020-03-10 ENCOUNTER — LAB VISIT (OUTPATIENT)
Dept: LAB | Facility: HOSPITAL | Age: 60
End: 2020-03-10
Attending: INTERNAL MEDICINE
Payer: MEDICARE

## 2020-03-10 ENCOUNTER — OFFICE VISIT (OUTPATIENT)
Dept: GASTROENTEROLOGY | Facility: CLINIC | Age: 60
End: 2020-03-10
Payer: MEDICARE

## 2020-03-10 VITALS
WEIGHT: 148.56 LBS | SYSTOLIC BLOOD PRESSURE: 136 MMHG | HEIGHT: 71 IN | BODY MASS INDEX: 20.8 KG/M2 | DIASTOLIC BLOOD PRESSURE: 76 MMHG | HEART RATE: 76 BPM

## 2020-03-10 DIAGNOSIS — B18.2 CHRONIC HEPATITIS C WITHOUT HEPATIC COMA: ICD-10-CM

## 2020-03-10 DIAGNOSIS — C18.4 MALIGNANT NEOPLASM OF TRANSVERSE COLON: ICD-10-CM

## 2020-03-10 DIAGNOSIS — D49.89 NEOPLASM OF ABDOMEN: ICD-10-CM

## 2020-03-10 DIAGNOSIS — R79.89 ABNORMAL LFTS: ICD-10-CM

## 2020-03-10 DIAGNOSIS — B18.2 CHRONIC HEPATITIS C WITHOUT HEPATIC COMA: Primary | ICD-10-CM

## 2020-03-10 LAB
AMPHET+METHAMPHET UR QL: NEGATIVE
BARBITURATES UR QL SCN>200 NG/ML: NEGATIVE
BENZODIAZ UR QL SCN>200 NG/ML: NEGATIVE
BZE UR QL SCN: NEGATIVE
CANNABINOIDS UR QL SCN: NEGATIVE
CREAT UR-MCNC: 106 MG/DL (ref 23–375)
ETHANOL UR-MCNC: <10 MG/DL
INR PPP: 1 (ref 0.8–1.2)
METHADONE UR QL SCN>300 NG/ML: NEGATIVE
OPIATES UR QL SCN: NEGATIVE
PCP UR QL SCN>25 NG/ML: NEGATIVE
PROTHROMBIN TIME: 10.6 SEC (ref 9–12.5)
TOXICOLOGY INFORMATION: NORMAL

## 2020-03-10 PROCEDURE — 36415 COLL VENOUS BLD VENIPUNCTURE: CPT | Mod: HCNC

## 2020-03-10 PROCEDURE — 99499 UNLISTED E&M SERVICE: CPT | Mod: S$GLB,,, | Performed by: INTERNAL MEDICINE

## 2020-03-10 PROCEDURE — 86705 HEP B CORE ANTIBODY IGM: CPT | Mod: HCNC

## 2020-03-10 PROCEDURE — 80307 DRUG TEST PRSMV CHEM ANLYZR: CPT | Mod: HCNC

## 2020-03-10 PROCEDURE — 86704 HEP B CORE ANTIBODY TOTAL: CPT | Mod: HCNC

## 2020-03-10 PROCEDURE — 99499 RISK ADDL DX/OHS AUDIT: ICD-10-PCS | Mod: S$GLB,,, | Performed by: INTERNAL MEDICINE

## 2020-03-10 PROCEDURE — 3008F BODY MASS INDEX DOCD: CPT | Mod: HCNC,CPTII,S$GLB, | Performed by: INTERNAL MEDICINE

## 2020-03-10 PROCEDURE — 99214 PR OFFICE/OUTPT VISIT, EST, LEVL IV, 30-39 MIN: ICD-10-PCS | Mod: HCNC,S$GLB,, | Performed by: INTERNAL MEDICINE

## 2020-03-10 PROCEDURE — 3008F PR BODY MASS INDEX (BMI) DOCUMENTED: ICD-10-PCS | Mod: HCNC,CPTII,S$GLB, | Performed by: INTERNAL MEDICINE

## 2020-03-10 PROCEDURE — 87340 HEPATITIS B SURFACE AG IA: CPT | Mod: HCNC

## 2020-03-10 PROCEDURE — 99214 OFFICE O/P EST MOD 30 MIN: CPT | Mod: HCNC,S$GLB,, | Performed by: INTERNAL MEDICINE

## 2020-03-10 PROCEDURE — 85610 PROTHROMBIN TIME: CPT | Mod: HCNC

## 2020-03-10 PROCEDURE — 86706 HEP B SURFACE ANTIBODY: CPT | Mod: HCNC

## 2020-03-10 PROCEDURE — 99999 PR PBB SHADOW E&M-EST. PATIENT-LVL III: CPT | Mod: PBBFAC,HCNC,, | Performed by: INTERNAL MEDICINE

## 2020-03-10 PROCEDURE — 99999 PR PBB SHADOW E&M-EST. PATIENT-LVL III: ICD-10-PCS | Mod: PBBFAC,HCNC,, | Performed by: INTERNAL MEDICINE

## 2020-03-10 RX ORDER — TRAZODONE HYDROCHLORIDE 150 MG/1
150 TABLET ORAL NIGHTLY PRN
COMMUNITY

## 2020-03-10 NOTE — PROGRESS NOTES
[facility]  Gastroenterology    Patient evaluated at the request of Jorge Avendano MD  [address] [place] [address]    PCP: Parvez Lopez MD    3/10/20    Reason for Visit: Chronic HCV, Abnormal LFTs    Subjective:   Beltran Cruz is a 59 y.o. 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 Patient reports he was seen previously for HCV genotype 1a in 2017 and was told he had some liver scarring a that time. He was prescribed Harvoni x 12 weeks which he completed. He states he was told his viral load was negative at one point but he was still on therapy at that time. Once he completed the Harvoni, he never followed up gain and assumed his HCV was cured. He attributes this to being so happy that his colon cancer had been cured at the time as well. He recently went to visit his physician and was told his liver chemistries was abnormal. His HCV viral load was checked and was detected. Patient denies ETOH use. He denies any other complaints at this time.       Past Medical History:   Diagnosis Date    Back pain     Cancer     Colon    Chronic hepatitis C     Chronic obstructive pulmonary disease 8/9/2016    Colon cancer 10/2016    T1 N0    Colon polyps     Colonoscopy 9/6/2016    Diverticulosis     Colonoscopy 9/6/2016    Drug overdose, intentional 03/22/2008    benzo,opiates, alcohol, asa , acetomenopjen    Hemorrhoids     Colonoscopy 9/6/2016    Screening PSA (prostate specific antigen) 8/24/2016       Past Surgical History:   Procedure Laterality Date    ANKLE SURGERY      APPENDECTOMY      BACK SURGERY      COLONOSCOPY N/A 9/6/2016    Procedure: COLONOSCOPY;  Surgeon: Jimbo Farrell MD;  Location: [facility] ENDO;  Service: Endoscopy;  Laterality: N/A;    COLONOSCOPY N/A 7/17/2017    Procedure: COLONOSCOPY;  Surgeon: Jimbo Farrell MD;  Location: [facility]  ENDO;  Service: Endoscopy;  Laterality: N/A;    HEMICOLECTOMY  10/4/ 2016    laprascopic for colon ca    SHOULDER SURGERY         Current Outpatient Medications on File Prior to Visit   Medication Sig Dispense Refill    albuterol (PROVENTIL/VENTOLIN HFA) 90 mcg/actuation inhaler Inhale 2 puffs into the lungs every 6 (six) hours as needed for Wheezing. Dispense with spacer. 1 Inhaler 11    atropine 1% (ISOPTO ATROPINE) 1 % Drop Place 1 drop into both eyes 2 (two) times daily. 1 Bottle 3    buprenorphine-naloxone (SUBOXONE) 8-2 mg Film DISSOLVE ONE FILM UNDER TONGUE EVERY DAY ALLOW TO DISSOLVE SLOWLY IN MOUTH WITHOUT CHEWING OR SWALLOWING      clonazePAM (KLONOPIN) 0.5 MG tablet Take 0.5 mg by mouth 3 (three) times daily as needed.  1    traZODone (DESYREL) 150 MG tablet Take 150 mg by mouth nightly as needed for Insomnia.      prednisoLONE acetate (PRED FORTE) 1 % DrpS Place 1 drop into the right eye every 2 (two) hours. (Patient not taking: Reported on 3/10/2020) 10 mL 3     No current facility-administered medications on file prior to visit.        Review of patient's allergies indicates:   Allergen Reactions    Penicillins Shortness Of Breath     Throat swelling        Social History     Socioeconomic History    Marital status:      Spouse name: Not on file    Number of children: Not on file    Years of education: Not on file    Highest education level: Not on file   Occupational History    Not on file   Social Needs    Financial resource strain: Not on file    Food insecurity:     Worry: Not on file     Inability: Not on file    Transportation needs:     Medical: Not on file     Non-medical: Not on file   Tobacco Use    Smoking status: Current Every Day Smoker     Packs/day: 1.00     Years: 42.00     Pack years: 42.00     Types: Cigarettes    Smokeless tobacco: Never Used    Tobacco comment: no smoking after 12 midnight prior to surgery   Substance and Sexual Activity    Alcohol use:  Not Currently     Comment: social    Drug use: Yes     Types: IV     Comment: Jane IV    Sexual activity: Not on file   Lifestyle    Physical activity:     Days per week: Not on file     Minutes per session: Not on file    Stress: Not on file   Relationships    Social connections:     Talks on phone: Not on file     Gets together: Not on file     Attends Mosque service: Not on file     Active member of club or organization: Not on file     Attends meetings of clubs or organizations: Not on file     Relationship status: Not on file   Other Topics Concern    Not on file   Social History Narrative    Not on file       Family History   Problem Relation Age of Onset    Retinal detachment Brother     Hypertension Brother        Review of Systems   Constitutional: Negative for appetite change, fever and unexpected weight change.   HENT: Negative for sore throat and trouble swallowing.    Eyes: Negative for visual disturbance.   Respiratory: Negative for cough, shortness of breath and wheezing.    Cardiovascular: Negative for chest pain, palpitations and leg swelling.   Gastrointestinal: Negative for abdominal pain, blood in stool, constipation, diarrhea, nausea and vomiting.   Genitourinary: Negative for dysuria.   Musculoskeletal: Negative for arthralgias and myalgias.   Skin: Negative for color change, pallor and rash.   Neurological: Negative for dizziness and weakness.   Hematological: Negative for adenopathy.   Psychiatric/Behavioral: Negative for agitation.           Objective:   Vitals:   Vitals:    03/10/20 1110   BP: 136/76   Pulse: 76       Physical Exam   Constitutional: He is oriented to person, place, and time. No distress.   HENT:   Head: Normocephalic and atraumatic.   Mouth/Throat: No oropharyngeal exudate.   Eyes: Pupils are equal, round, and reactive to light. Conjunctivae and EOM are normal. Right eye exhibits no discharge. Left eye exhibits no discharge. No scleral icterus.   Neck: Normal  range of motion.   Cardiovascular: Normal rate, regular rhythm and normal heart sounds. Exam reveals no gallop and no friction rub.   No murmur heard.  Pulmonary/Chest: Effort normal and breath sounds normal. No stridor. No respiratory distress. He has no wheezes. He has no rales.   Abdominal: Soft. Bowel sounds are normal. He exhibits no distension and no mass. There is no tenderness. There is no guarding.   Musculoskeletal: Normal range of motion. He exhibits no edema.   Neurological: He is alert and oriented to person, place, and time.   Skin: Skin is warm and dry. No rash noted. He is not diaphoretic. No erythema. No pallor.   Psychiatric: He has a normal mood and affect.   Vitals reviewed.        IMPRESSION     Problem List Items Addressed This Visit        Oncology    Malignant neoplasm of transverse colon (Chronic)       GI    Chronic hepatitis C - Primary (Chronic)    Relevant Orders    Toxicology screen, urine    US Abdomen Limited    Protime-INR    Hepatitis B Surface Antigen    Hepatitis B surface antibody    HEPATITIS B CORE ANTIBODY, TOTAL    HEPATITIS B CORE ANTIBODY, IGM      Other Visit Diagnoses     Neoplasm of abdomen        Abnormal LFTs        Relevant Orders    Toxicology screen, urine    US Abdomen Limited    Protime-INR    Hepatitis B Surface Antigen    Hepatitis B surface antibody    HEPATITIS B CORE ANTIBODY, TOTAL    HEPATITIS B CORE ANTIBODY, IGM          PLANS:    - Patient with chronic HCV   - Treatment experienced with Harvoni x 12 weeks in 2017   - Genotype 1a. No need to recheck at this time   - Viral load 48,707  - Will get UDS, INR and HBV serologies  - US liver ordered  - Recent CBC, CMP already completed  - Patient has history of stage I colon cancer which has been cured since 2017. Not a contraindication to treat  - Patient denies any drug use aside from Suboxone which he is prescribed   - RTC in 1 month for referral for therapy    Chronic hepatitis C without hepatic coma  -      Toxicology screen, urine  -     US Abdomen Limited  -     Protime-INR; Future; Expected date: 03/10/2020  -     Hepatitis B Surface Antigen; Future; Expected date: 03/10/2020  -     Hepatitis B surface antibody; Future; Expected date: 03/10/2020  -     HEPATITIS B CORE ANTIBODY, TOTAL; Future; Expected date: 03/10/2020  -     HEPATITIS B CORE ANTIBODY, IGM; Future; Expected date: 03/10/2020    Neoplasm of abdomen  -     Ambulatory referral/consult to Gastroenterology    Malignant neoplasm of transverse colon  -     Ambulatory referral/consult to Gastroenterology    Abnormal LFTs  -     Toxicology screen, urine  -     US Abdomen Limited  -     Protime-INR; Future; Expected date: 03/10/2020  -     Hepatitis B Surface Antigen; Future; Expected date: 03/10/2020  -     Hepatitis B surface antibody; Future; Expected date: 03/10/2020  -     HEPATITIS B CORE ANTIBODY, TOTAL; Future; Expected date: 03/10/2020  -     HEPATITIS B CORE ANTIBODY, IGM; Future; Expected date: 03/10/2020            Patricia Christy MD  Gastroenterology and Hepatology

## 2020-03-10 NOTE — LETTER
March 10, 2020      Jorge Avendano MD  [address]  [place] [address]           [facility]  [id number] [place] [id number]  Phone: [phone]  Fax: [phone]          Patient: Beltran Cruz   MR Number: [id number]   YOB: 1960   Date of Visit: 3/10/2020       Dear Dr. Jorge Avendano:    Thank you for referring Beltran Cruz to me for evaluation. Attached you will find relevant portions of my assessment and plan of care.    If you have questions, please do not hesitate to call me. I look forward to following Beltran Cruz along with you.    Sincerely,    Patricia Christy MD    Enclosure  CC:  No Recipients    If you would like to receive this communication electronically, please contact [email or web address] or [phone] to request more information on [facility] Link access.    For providers and/or their staff who would like to refer a patient to [facility], please contact us through our one-stop-shop provider referral line, [facility], at [phone].    If you feel you have received this communication in error or would no longer like to receive these types of communications, please e-mail [email or web address]

## 2020-03-11 LAB
HBV CORE AB SERPL QL IA: POSITIVE
HBV CORE IGM SERPL QL IA: NEGATIVE
HBV SURFACE AB SER-ACNC: POSITIVE M[IU]/ML
HBV SURFACE AG SERPL QL IA: NEGATIVE

## 2020-03-16 ENCOUNTER — CLINICAL SUPPORT (OUTPATIENT)
Dept: SMOKING CESSATION | Facility: CLINIC | Age: 60
End: 2020-03-16
Payer: COMMERCIAL

## 2020-03-16 DIAGNOSIS — F17.210 MODERATE SMOKER (20 OR LESS PER DAY): Primary | ICD-10-CM

## 2020-03-16 PROCEDURE — 99999 PR PBB SHADOW E&M-EST. PATIENT-LVL I: CPT | Mod: PBBFAC,,,

## 2020-03-16 PROCEDURE — 99404 PREV MED CNSL INDIV APPRX 60: CPT | Mod: S$GLB,,,

## 2020-03-16 PROCEDURE — 99404 PR PREVENT COUNSEL,INDIV,60 MIN: ICD-10-PCS | Mod: S$GLB,,,

## 2020-03-16 PROCEDURE — 99999 PR PBB SHADOW E&M-EST. PATIENT-LVL I: ICD-10-PCS | Mod: PBBFAC,,,

## 2020-03-16 RX ORDER — IBUPROFEN 200 MG
1 TABLET ORAL DAILY
Qty: 28 PATCH | Refills: 0 | Status: SHIPPED | OUTPATIENT
Start: 2020-03-16 | End: 2020-04-15 | Stop reason: SDUPTHER

## 2020-03-16 RX ORDER — DM/P-EPHED/ACETAMINOPH/DOXYLAM 30-7.5/3
2 LIQUID (ML) ORAL 2 TIMES DAILY PRN
Qty: 270 LOZENGE | Refills: 0 | Status: SHIPPED | OUTPATIENT
Start: 2020-03-16 | End: 2020-05-25

## 2020-03-16 NOTE — PROGRESS NOTES
1st time in program. Quit for 1 year cold turkey at work .Wants to quit had cancer and wants to feel better with breathing. Will use 21 mg nicotine patch and 2 mg nicotine lozenges. He is smoking 20 cigarettes/day. The patient will continue individual  and/or group sessions and medication monitoring by CTTS. Prescribed medication management will be by [facility] Medical Practitioner.

## 2020-03-23 ENCOUNTER — CLINICAL SUPPORT (OUTPATIENT)
Dept: SMOKING CESSATION | Facility: CLINIC | Age: 60
End: 2020-03-23
Payer: COMMERCIAL

## 2020-03-23 DIAGNOSIS — F17.210 MODERATE SMOKER (20 OR LESS PER DAY): Primary | ICD-10-CM

## 2020-03-23 PROCEDURE — 99407 BEHAV CHNG SMOKING > 10 MIN: CPT | Mod: S$GLB,,,

## 2020-03-23 PROCEDURE — 99407 PR TOBACCO USE CESSATION INTENSIVE >10 MINUTES: ICD-10-PCS | Mod: S$GLB,,,

## 2020-03-24 NOTE — PROGRESS NOTES
The patient is only smoking 1 cigarette at night and is borrowing one. He was smoking 20 cigarettes, but started the 21 mg nicotine patch and was able to cut way down. He was not able to get the 2 mg nicotine lozenges because the pharmacy did not have it. He is going to use the lozenges at night to see if he can take the cigarette at night. He has been stressed with the COVID-19 virus and had smoked about 10 cigarettes.Discussed stress during this time and not feeling bad if he had a slip, just try to stay on track. Congratulated the patient on his efforts. Informed patient of phone sessions and patient agreed to phone sessions. The patient will continue individual  and/or group sessions and medication monitoring by CTTS. Prescribed medication management will be by [facility] Medical Practitioner. The patient denies any abnormal behavioral or mental changes at this time.

## 2020-04-06 ENCOUNTER — TELEPHONE (OUTPATIENT)
Dept: SMOKING CESSATION | Facility: CLINIC | Age: 60
End: 2020-04-06

## 2020-04-15 ENCOUNTER — CLINICAL SUPPORT (OUTPATIENT)
Dept: SMOKING CESSATION | Facility: CLINIC | Age: 60
End: 2020-04-15
Payer: COMMERCIAL

## 2020-04-15 DIAGNOSIS — F17.210 MODERATE SMOKER (20 OR LESS PER DAY): Primary | ICD-10-CM

## 2020-04-15 PROCEDURE — 99402 PREV MED CNSL INDIV APPRX 30: CPT | Mod: S$GLB,,,

## 2020-04-15 PROCEDURE — 99402 PR PREVENT COUNSEL,INDIV,30 MIN: ICD-10-PCS | Mod: S$GLB,,,

## 2020-04-15 RX ORDER — IBUPROFEN 200 MG
1 TABLET ORAL DAILY
Qty: 28 PATCH | Refills: 0 | Status: SHIPPED | OUTPATIENT
Start: 2020-04-15 | End: 2020-05-04 | Stop reason: SDUPTHER

## 2020-04-15 NOTE — PROGRESS NOTES
Individual Follow-Up Form    4/15/2020    Quit Date: TBD    Clinical Status of Patient: Outpatient    Length of Service: 30 minutes    Continuing Medication: yes  Patches or Nicotine Lozenges    Other Medications: none     Target Symptoms: Withdrawal and medication side effects. The following were  rated moderate (3) to severe (4) on TCRS:  · Moderate (3): desire/crave frustration,, anxiety, - Nicotine replacement therapy, withdrawal symptoms, habit  ·   · Severe (4): none    Comments: The patient is smoking 3-5 cigarettes/day  from 20 cigarettes/day. The patient is using the 21 mg nicotine patch and 2 mg nicotine lozenge. However, the nicotine lozenge are making him nauseated. He does feel it may work and will try cutting the lozenge in half or forth because it is too much and is probably swallowing too much at one time. He is still frustrated and restless with the COVID -19 virus- just not sure of outcome, but is glad to see the numbers are less than expected. He is going to slowly work on cutting out 1 cigarette/week, but mostly staying safe. The patient will continue individual  and/or group sessions and medication monitoring by CTTS. Prescribed medication management will be by [facility] Practitioner.The patient denies any abnormal behavioral or mental changes at this time.Reordered 21 mg nicotine patches today.   .       Diagnosis: F17.210    Next Visit: 2 weeks

## 2020-04-15 NOTE — Clinical Note
The patient is smoking 3-5 cigarettes/day  from 20 cigarettes/day. The patient is using the 21 mg nicotine patch and 2 mg nicotine lozenge. However, the nicotine lozenge are making him nauseated. He does feel it may work and will try cutting the lozenge in half or forth because it is too much and is probably swallowing too much at one time. He is still frustrated and restless with the COVID -19 virus- just not sure of outcome, but is glad to see the numbers are less than expected. He is going to slowly work on cutting out 1 cigarette/week, but mostly staying safe

## 2020-04-20 ENCOUNTER — TELEPHONE (OUTPATIENT)
Dept: SMOKING CESSATION | Facility: CLINIC | Age: 60
End: 2020-04-20

## 2020-04-20 NOTE — TELEPHONE ENCOUNTER
1st attempt    Attempt to call. Voicemail box not set up yet or full. Could not leave message. Home phone not reachable could not leave message.

## 2020-05-01 ENCOUNTER — TELEPHONE (OUTPATIENT)
Dept: ENDOSCOPY | Facility: HOSPITAL | Age: 60
End: 2020-05-01

## 2020-05-01 RX ORDER — SODIUM, POTASSIUM,MAG SULFATES 17.5-3.13G
1 SOLUTION, RECONSTITUTED, ORAL ORAL DAILY
Qty: 1 KIT | Refills: 0 | Status: SHIPPED | OUTPATIENT
Start: 2020-05-01 | End: 2020-05-03

## 2020-05-01 NOTE — TELEPHONE ENCOUNTER
COVID Screening     1. Have you had a fever in the last 7 days or have you used fever reducing medicines for a fever in the last 7 days?  no    2. Are you experiencing shortness of breath, cough, muscle aches, loss of taste or loss of smell?  no    3. Are you residing with anyone who has tested positive for Covid?  no    If answered yes to any of the above questions, the pt must be scheduled for an appointment with their PCP.    A message also needs to be sent to the endoscopist to ensure the patient gets rescheduled at a later date.     ENDO screening    1. Have you been admitted overnight to the hospital in the past 3 months? no   If yes, schedule an appointment with PCP before scheduling endoscopic procedure.     2. Have you had a stent placed in the last 12 months? no   If yes, for a screening visit, cancel and message the ordering provider.  The patient will need a new order when the time is appropriate.     3. Have you had a stroke or heart attack in the past 6 months? no   If yes, cancel and refer patient to ordering provider for clearance, also message ordering provider to inform.     4. Have you had any chest pain in the past 3 months? no   If yes, Have you been evaluated by your PCP and/or cardiologist and it was determined to not be heart related? no   If No, Pt needs to be seen by PCP or Cardiologist .  Pt can be scheduled once clearance obtained by either of those providers.     5. Do you take prescription weight loss medications?  no   If yes, must stop for 2 weeks prior to procedure.     6. Have you been diagnosed with diverticulitis within the past 3 months? no   If yes, must have been seen by GI within the last 3 months, if not schedule with GI NANY.    If pt has been seen by GI, schedule procedure 8-12 weeks post antibiotic treatment.     7. Are you on Dialysis? no  If yes, schedule procedure for the day AFTER dialysis.  Appt time should be 9am or later, patient arrival time is 2 hours prior.   "Nulytely or    miralax prep for all patients with kidney disease.     8. Are you diabetic?  no   If yes, schedule morning appt. Advise pt to hold all diabetic meds day of procedure.     9. If pt is older than 80 years of age and HAS NOT been seen by GI or PCP within the last 6 months, needs appt with GI NANY.   If pt has been seen by the GI provider or PCP within the past 6  months AND meets criteria, schedule procedure AND send message to the endoscopist.     10. Is patient on a "high risk" medication (blood thinner/antiplatelet agent)?  no   If yes, has cardiac clearance been obtained within the last 60 days? N/A   If no, a new clearance needs to be obtained.       I have reviewed the last colonoscopy for recommendations regarding next procedure bowel prep.  yes  I have reviewed medications and allergies.  yes  I have verified the pharmacy information and appropriate prep sent if needed. yes  Prep instructions have been mailed or sent to portal per patient request. yes    If answers yes to any of the following, schedule at [facility] ONLY. If No, OK for either location.     Is BMI over 45?   Any complaints of chest pain, new onset or at rest?  Does pt have an AICD?  Is there a diagnosis of heart failure?  Does patient have an insulin pump?  If procedure for esophageal banding?  "

## 2020-05-04 ENCOUNTER — CLINICAL SUPPORT (OUTPATIENT)
Dept: SMOKING CESSATION | Facility: CLINIC | Age: 60
End: 2020-05-04
Payer: COMMERCIAL

## 2020-05-04 DIAGNOSIS — F17.210 MODERATE SMOKER (20 OR LESS PER DAY): ICD-10-CM

## 2020-05-04 PROCEDURE — 99402 PR PREVENT COUNSEL,INDIV,30 MIN: ICD-10-PCS | Mod: S$GLB,,,

## 2020-05-04 PROCEDURE — 99999 PR PBB SHADOW E&M-EST. PATIENT-LVL I: ICD-10-PCS | Mod: PBBFAC,,,

## 2020-05-04 PROCEDURE — 99402 PREV MED CNSL INDIV APPRX 30: CPT | Mod: S$GLB,,,

## 2020-05-04 PROCEDURE — 99999 PR PBB SHADOW E&M-EST. PATIENT-LVL I: CPT | Mod: PBBFAC,,,

## 2020-05-04 RX ORDER — IBUPROFEN 200 MG
1 TABLET ORAL DAILY
Qty: 28 PATCH | Refills: 0 | Status: SHIPPED | OUTPATIENT
Start: 2020-05-04 | End: 2020-06-08 | Stop reason: SDUPTHER

## 2020-05-04 NOTE — PROGRESS NOTES
Individual Follow-Up Form 3   5/4/2020    Quit Date: TBD    Clinical Status of Patient: Outpatient    Length of Service: 30 minutes    Continuing Medication: yes  Patches or Nicotine Lozenges    Other Medications: none     Target Symptoms: Withdrawal and medication side effects. The following were  rated moderate (3) to severe (4) on TCRS:  · Moderate (3): frustrated, desire/crave, - Nicotine replacement therapy, withdrawal symptoms, habit  ·   · Severe (4): none    Comments: The patient is smoking 5-10 cigarettes/day  from 20 cigarettes/day. He is using the 21 mg nicotine patch and 2 mg nicotine lozenge. He has been running out of th patches and was told by the drug store he could only get 21 mg nicotine patches x 1. The second order was sent to the [facility] instead of [facility]. Reordered correctly today and called  To verify that the patient could  the patches. He was using 1 patch for 2 days which would explain why he was going up. He is ready to quit and di state the 21 mg nicotine patches were helping when used every day. The patient will continue individual  and/or group sessions and medication monitoring by CTTS. Prescribed medication management will be by [facility] Practitioner.The patient denies any abnormal behavioral or mental changes at this time. Spoke with Reji the Pharmacy personal and prescription will be filled.   Diagnosis: F17.210    Next Visit: 2 weeks

## 2020-05-18 ENCOUNTER — TELEPHONE (OUTPATIENT)
Dept: SMOKING CESSATION | Facility: CLINIC | Age: 60
End: 2020-05-18

## 2020-05-18 ENCOUNTER — OFFICE VISIT (OUTPATIENT)
Dept: OPHTHALMOLOGY | Facility: CLINIC | Age: 60
End: 2020-05-18
Payer: MEDICARE

## 2020-05-18 DIAGNOSIS — H40.051 OCULAR HYPERTENSION OF RIGHT EYE: ICD-10-CM

## 2020-05-18 DIAGNOSIS — H20.9 IRITIS OF RIGHT EYE: Primary | ICD-10-CM

## 2020-05-18 DIAGNOSIS — H52.4 BILATERAL PRESBYOPIA: ICD-10-CM

## 2020-05-18 DIAGNOSIS — H25.13 CATARACT, NUCLEAR SCLEROTIC SENILE, BILATERAL: ICD-10-CM

## 2020-05-18 PROCEDURE — 99999 PR PBB SHADOW E&M-EST. PATIENT-LVL II: CPT | Mod: PBBFAC,HCNC,, | Performed by: OPTOMETRIST

## 2020-05-18 PROCEDURE — 92014 COMPRE OPH EXAM EST PT 1/>: CPT | Mod: HCNC,S$GLB,, | Performed by: OPTOMETRIST

## 2020-05-18 PROCEDURE — 92015 DETERMINE REFRACTIVE STATE: CPT | Mod: HCNC,S$GLB,, | Performed by: OPTOMETRIST

## 2020-05-18 PROCEDURE — 92014 PR EYE EXAM, EST PATIENT,COMPREHESV: ICD-10-PCS | Mod: HCNC,S$GLB,, | Performed by: OPTOMETRIST

## 2020-05-18 PROCEDURE — 99999 PR PBB SHADOW E&M-EST. PATIENT-LVL II: ICD-10-PCS | Mod: PBBFAC,HCNC,, | Performed by: OPTOMETRIST

## 2020-05-18 PROCEDURE — 92015 PR REFRACTION: ICD-10-PCS | Mod: HCNC,S$GLB,, | Performed by: OPTOMETRIST

## 2020-05-18 RX ORDER — LATANOPROST 50 UG/ML
1 SOLUTION/ DROPS OPHTHALMIC NIGHTLY
Qty: 2.5 ML | Refills: 11 | Status: SHIPPED | OUTPATIENT
Start: 2020-05-18 | End: 2021-05-18

## 2020-05-18 RX ORDER — PREDNISOLONE ACETATE 10 MG/ML
1 SUSPENSION/ DROPS OPHTHALMIC EVERY 4 HOURS
Qty: 5 ML | Refills: 0 | Status: SHIPPED | OUTPATIENT
Start: 2020-05-18 | End: 2021-05-18

## 2020-05-18 NOTE — PROGRESS NOTES
HPI     Trouble driving during the day and at night.  Patient states vision is like looking through a stain glass x couple of   months.  Headaches x 1 month.  Patient was seeing flashes of light at the down of right eye that has   cleared up some.  Last eye visit 01/31/2020 [facility].  HX of traumatic Iritis.    Last edited by Georgiana George on 5/18/2020  2:49 PM. (History)            Assessment /Plan     For exam results, see Encounter Report.    Iritis of right eye  -     prednisoLONE acetate (PRED FORTE) 1 % DrpS; Place 1 drop into the right eye every 4 (four) hours.  Dispense: 5 mL; Refill: 0    Ocular hypertension of right eye  -     latanoprost (XALATAN) 0.005 % ophthalmic solution; Place 1 drop into both eyes every evening.  Dispense: 2.5 mL; Refill: 11    Cataract, nuclear sclerotic senile, bilateral    Bilateral presbyopia      Elevated IOP OD with iritis.    RTC 4 days recheck IOP and iritis

## 2020-05-22 ENCOUNTER — OFFICE VISIT (OUTPATIENT)
Dept: OPHTHALMOLOGY | Facility: CLINIC | Age: 60
End: 2020-05-22
Payer: MEDICARE

## 2020-05-22 DIAGNOSIS — H40.051 OCULAR HYPERTENSION OF RIGHT EYE: ICD-10-CM

## 2020-05-22 DIAGNOSIS — H20.9 IRITIS OF RIGHT EYE: Primary | ICD-10-CM

## 2020-05-22 PROCEDURE — 99999 PR PBB SHADOW E&M-EST. PATIENT-LVL I: CPT | Mod: PBBFAC,HCNC,, | Performed by: OPTOMETRIST

## 2020-05-22 PROCEDURE — 99999 PR PBB SHADOW E&M-EST. PATIENT-LVL I: ICD-10-PCS | Mod: PBBFAC,HCNC,, | Performed by: OPTOMETRIST

## 2020-05-22 PROCEDURE — 92012 PR EYE EXAM, EST PATIENT,INTERMED: ICD-10-PCS | Mod: HCNC,S$GLB,, | Performed by: OPTOMETRIST

## 2020-05-22 PROCEDURE — 92012 INTRM OPH EXAM EST PATIENT: CPT | Mod: HCNC,S$GLB,, | Performed by: OPTOMETRIST

## 2020-05-22 NOTE — PROGRESS NOTES
HPI     4 days Iritis check and IOP check .  High IOP right eye.  Decrease near visual acuity right eye.  Vision has not cleared up.  No pain.  Patient lost glasses prescription.  PF q4h OD  Latanoprost hs OD      Last edited by Jalen Alan, OD on 5/22/2020  9:56 AM. (History)            Assessment /Plan     For exam results, see Encounter Report.    Iritis of right eye    Ocular hypertension of right eye      Minimal decrease in IOP    Improved iritis OD  Wean PF q6 x 2 days, q8 x 2days, q12 x 2 days    RTC 5 days recheck iop and iritis

## 2020-05-25 ENCOUNTER — CLINICAL SUPPORT (OUTPATIENT)
Dept: SMOKING CESSATION | Facility: CLINIC | Age: 60
End: 2020-05-25
Payer: COMMERCIAL

## 2020-05-25 DIAGNOSIS — F17.210 MODERATE SMOKER (20 OR LESS PER DAY): Primary | ICD-10-CM

## 2020-05-25 PROCEDURE — 99999 PR PBB SHADOW E&M-EST. PATIENT-LVL I: CPT | Mod: PBBFAC,,,

## 2020-05-25 PROCEDURE — 99999 PR PBB SHADOW E&M-EST. PATIENT-LVL I: ICD-10-PCS | Mod: PBBFAC,,,

## 2020-05-25 PROCEDURE — 99402 PR PREVENT COUNSEL,INDIV,30 MIN: ICD-10-PCS | Mod: S$GLB,,,

## 2020-05-25 PROCEDURE — 99402 PREV MED CNSL INDIV APPRX 30: CPT | Mod: S$GLB,,,

## 2020-05-25 RX ORDER — VARENICLINE TARTRATE 0.5 (11)-1
KIT ORAL
Qty: 53 TABLET | Refills: 0 | Status: SHIPPED | OUTPATIENT
Start: 2020-05-25 | End: 2020-07-13 | Stop reason: ALTCHOICE

## 2020-05-25 NOTE — PROGRESS NOTES
Individual Follow-Up Form    5/25/2020    Quit Date: TBD    Clinical Status of Patient: Outpatient    Length of Service: 30 minutes    Continuing Medication: yes  Patches or Nicotine Lozenges    Other Medications: none     Target Symptoms: Withdrawal and medication side effects. The following were  rated moderate (3) to severe (4) on TCRS:  · Moderate (3): desire/frustration depressed, anxious, nervous, restless, agitated, - Nicotine replacement therapy, withdrawal symptoms, habit  ·   · Severe (4): none    Comments: The patient is smoking 5-10 cigarettes/day  from 20 cigarettes/day. He is using the 21 mg nicotine patch and was using the 2 mg nicotine lozenge, but it was making him nauseated. He is very frustrated, anxious because he is worried the cancer is back and has a colonoscopy to see. He knows the smoking is affecting his health and he needs to quit but has been stuck for awhile. Suggested he might try the Chantix starter dose to see if it helps, but to start after his test to let him see how he feels at that time. Discontinued the lozenges and ordered Chantix. The patient will continue individual  and/or group sessions and medication monitoring by CTTS. Prescribed medication management will be by [facility] Medical Practitioner.The patient denies any abnormal behavioral or mental changes at this time.      Diagnosis: F17.210    Next Visit: 2 weeks

## 2020-05-27 ENCOUNTER — TELEPHONE (OUTPATIENT)
Dept: ENDOSCOPY | Facility: HOSPITAL | Age: 60
End: 2020-05-27

## 2020-05-27 DIAGNOSIS — Z12.11 ENCOUNTER FOR SCREENING COLONOSCOPY: Primary | ICD-10-CM

## 2020-05-30 ENCOUNTER — APPOINTMENT (OUTPATIENT)
Dept: URGENT CARE | Facility: CLINIC | Age: 60
End: 2020-05-30
Payer: MEDICARE

## 2020-05-30 DIAGNOSIS — Z12.11 ENCOUNTER FOR SCREENING COLONOSCOPY: ICD-10-CM

## 2020-05-30 PROCEDURE — U0003 INFECTIOUS AGENT DETECTION BY NUCLEIC ACID (DNA OR RNA); SEVERE ACUTE RESPIRATORY SYNDROME CORONAVIRUS 2 (SARS-COV-2) (CORONAVIRUS DISEASE [COVID-19]), AMPLIFIED PROBE TECHNIQUE, MAKING USE OF HIGH THROUGHPUT TECHNOLOGIES AS DESCRIBED BY CMS-2020-01-R: HCPCS | Mod: HCNC

## 2020-06-01 ENCOUNTER — NURSE TRIAGE (OUTPATIENT)
Dept: ADMINISTRATIVE | Facility: CLINIC | Age: 60
End: 2020-06-01

## 2020-06-01 ENCOUNTER — TELEPHONE (OUTPATIENT)
Dept: GASTROENTEROLOGY | Facility: CLINIC | Age: 60
End: 2020-06-01

## 2020-06-01 LAB — SARS-COV-2 RNA RESP QL NAA+PROBE: NOT DETECTED

## 2020-06-01 NOTE — TELEPHONE ENCOUNTER
Reason for Disposition   Question about upcoming scheduled test, no triage required and triager able to answer question    Protocols used: INFORMATION ONLY CALL-A-AH    Needs to cancel today's colonoscopy because he is vomiting and not running clear bowels yet. Patient instructed to call endoscopy at 5:50am since his appt was for 6 am. He verbalized understanding.

## 2020-06-01 NOTE — TELEPHONE ENCOUNTER
"Spoke to pt and he reports she could not drink the prep, suprep. Caused him to vomit after the first prep and then continued clear liquid diet but started vomiting again after starting the second prep. He knew he was not "cleaned out" so he called and cancelled his appt.   He reports feeling better now, no more nausea or vomiting. Please advise.   "

## 2020-06-01 NOTE — TELEPHONE ENCOUNTER
Patient is followed by Dr. Christy - [facility]. He will likely need a different and small volume bowel prep with clear liquids day before to help him tolerate bowel prep for colonoscopy. Agree he needs to be rescheduled. He is overdue for surveillance given hx colon cancer.

## 2020-06-01 NOTE — TELEPHONE ENCOUNTER
----- Message from Percy Becerra sent at 6/1/2020  6:20 AM CDT -----  Contact: Pt  Pt would like to be called back asap regarding rescheduling his 7am  Colonoscopy this morning due to difficulty using prep liquid.    Pt can be reached at [phone].    Thanks

## 2020-06-02 ENCOUNTER — TELEPHONE (OUTPATIENT)
Dept: ENDOSCOPY | Facility: HOSPITAL | Age: 60
End: 2020-06-02

## 2020-06-02 NOTE — TELEPHONE ENCOUNTER
[id number] Attempted to reschedule pt procedure.  No answer, no voicemail to leave call back info. On Mobile    0856 Attempted to reschedule pt procedure.  No answer, no voicemail to leave call back info. On home number

## 2020-06-08 ENCOUNTER — CLINICAL SUPPORT (OUTPATIENT)
Dept: SMOKING CESSATION | Facility: CLINIC | Age: 60
End: 2020-06-08
Payer: COMMERCIAL

## 2020-06-08 DIAGNOSIS — F17.210 MODERATE SMOKER (20 OR LESS PER DAY): Primary | ICD-10-CM

## 2020-06-08 PROCEDURE — 99999 PR PBB SHADOW E&M-EST. PATIENT-LVL I: ICD-10-PCS | Mod: PBBFAC,,,

## 2020-06-08 PROCEDURE — 99402 PR PREVENT COUNSEL,INDIV,30 MIN: ICD-10-PCS | Mod: S$GLB,,,

## 2020-06-08 PROCEDURE — 99999 PR PBB SHADOW E&M-EST. PATIENT-LVL I: CPT | Mod: PBBFAC,,,

## 2020-06-08 PROCEDURE — 99402 PREV MED CNSL INDIV APPRX 30: CPT | Mod: S$GLB,,,

## 2020-06-08 RX ORDER — IBUPROFEN 200 MG
1 TABLET ORAL DAILY
Qty: 28 PATCH | Refills: 0 | Status: SHIPPED | OUTPATIENT
Start: 2020-06-08 | End: 2020-07-13 | Stop reason: SDUPTHER

## 2020-06-08 NOTE — PROGRESS NOTES
Individual Follow-Up Form    6/8/2020    Quit Date: TBD    Clinical Status of Patient: Outpatient    Length of Service: 30 minutes    Continuing Medication: yes  Patches    Other Medications: Picked up Chantix -did not start due to colonoscopy.     Target Symptoms: Withdrawal and medication side effects. The following were  rated moderate (3) to severe (4) on TCRS:  · Moderate (3): anxious, frustrated, irritable, depressed, - Nicotine replacement therapy, withdrawal symptoms, habit  · Severe (4): none    Comments: The patient is smoking 4-5 cigarettes/day  from 20 cigarettes/day. He has not started the Chantix because he was to have a colonoscopy and got nauseated. He does want to quit smoking because he is afraid his cancer is back, but is having family issues at home that are not going to be resolved soon. Explained it is possible the issues will be there whether he smokes or not so he may be using this as an excuse not to try. Suggested focus should be on reduction not quit- going slow- relaxing 1-2/week. He stated he felt comfortable with that idea. Will start Chantix tomorrow and reordered patches. Does have [phone] number to call if needed. The patient will continue individual  and/or group sessions and medication monitoring by CTTS. Prescribed medication management will be by [facility] Medical Practitioner.The patient denies any abnormal behavioral or mental changes at this time.        Diagnosis: F17.210    Next Visit: 2 weeks

## 2020-06-29 ENCOUNTER — TELEPHONE (OUTPATIENT)
Dept: SMOKING CESSATION | Facility: CLINIC | Age: 60
End: 2020-06-29

## 2020-07-13 ENCOUNTER — CLINICAL SUPPORT (OUTPATIENT)
Dept: SMOKING CESSATION | Facility: CLINIC | Age: 60
End: 2020-07-13
Payer: COMMERCIAL

## 2020-07-13 DIAGNOSIS — F17.210 MODERATE SMOKER (20 OR LESS PER DAY): Primary | ICD-10-CM

## 2020-07-13 PROCEDURE — 99403 PREV MED CNSL INDIV APPRX 45: CPT | Mod: S$GLB,,,

## 2020-07-13 PROCEDURE — 99999 PR PBB SHADOW E&M-EST. PATIENT-LVL II: CPT | Mod: PBBFAC,,,

## 2020-07-13 PROCEDURE — 99403 PR PREVENT COUNSEL,INDIV,45 MIN: ICD-10-PCS | Mod: S$GLB,,,

## 2020-07-13 PROCEDURE — 99999 PR PBB SHADOW E&M-EST. PATIENT-LVL II: ICD-10-PCS | Mod: PBBFAC,,,

## 2020-07-13 RX ORDER — VARENICLINE TARTRATE 1 MG/1
1 TABLET, FILM COATED ORAL 2 TIMES DAILY
Qty: 60 TABLET | Refills: 1 | Status: SHIPPED | OUTPATIENT
Start: 2020-07-13 | End: 2020-08-20

## 2020-07-13 RX ORDER — IBUPROFEN 200 MG
1 TABLET ORAL DAILY
Qty: 28 PATCH | Refills: 0 | Status: SHIPPED | OUTPATIENT
Start: 2020-07-13

## 2020-07-13 NOTE — Clinical Note
Followed up with patient via telephone call. Patient is now down to 0-2 cpd. Stated he is going a few days without one and does fine, until he encounters a trigger and lights one up. He reports only taking 1-2 puffs and then throwing it out, they are tasting bad. The patient remains on the prescribed tobacco cessation medication regimen of 1 mg Chantix BID and 21 mg nicotine patches QAM without any negative side effects at this time. Will send refill for both. He is not wearing patches daily, he was worried he would run out. Advised patient to be consistent with patches and to wear them daily, until Chantix is fully on board. His goal is to continue to cut back, and attempt to go longer than a few days without a cigarette. Reviewed session handout. The patient denies any abnormal behavioral or mental changes at this time. Will follow up with patient in 2 weeks.

## 2020-07-13 NOTE — PROGRESS NOTES
Individual Follow-Up Form    7/13/2020    Quit Date: TBD    Clinical Status of Patient: Outpatient    Length of Service: 45 minutes    Continuing Medication: yes  Chantix    Other Medications: patches     Target Symptoms: Withdrawal and medication side effects. The following were  rated moderate (3) to severe (4) on TCRS:  · Moderate (3): none  · Severe (4): none    Comments: Followed up with patient via telephone call. Patient is now down to 0-2 cpd. Stated he is going a few days without one and does fine, until he encounters a trigger and lights one up. He reports only taking 1-2 puffs and then throwing it out, they are tasting bad. The patient remains on the prescribed tobacco cessation medication regimen of 1 mg Chantix BID and 21 mg nicotine patches QAM without any negative side effects at this time. Will send refill for both. He is not wearing patches daily, he was worried he would run out. Advised patient to be consistent with patches and to wear them daily, until Chantix is fully on board. His goal is to continue to cut back, and attempt to go longer than a few days without a cigarette. Reviewed session handout. The patient denies any abnormal behavioral or mental changes at this time. Will follow up with patient in 2 weeks.       Diagnosis: F17.200    Next Visit: 2 weeks

## 2020-07-27 ENCOUNTER — TELEPHONE (OUTPATIENT)
Dept: SMOKING CESSATION | Facility: CLINIC | Age: 60
End: 2020-07-27

## 2020-07-27 NOTE — TELEPHONE ENCOUNTER
Called patient in regards to scheduled phone appointment for smoking cessation. Unable to leave message. Will call back at a later time.

## 2020-08-24 ENCOUNTER — CLINICAL SUPPORT (OUTPATIENT)
Dept: SMOKING CESSATION | Facility: CLINIC | Age: 60
End: 2020-08-24
Payer: COMMERCIAL

## 2020-08-24 DIAGNOSIS — F17.210 MODERATE SMOKER (20 OR LESS PER DAY): Primary | ICD-10-CM

## 2020-08-24 PROCEDURE — 99999 PR PBB SHADOW E&M-EST. PATIENT-LVL I: CPT | Mod: PBBFAC,,,

## 2020-08-24 PROCEDURE — 99403 PREV MED CNSL INDIV APPRX 45: CPT | Mod: S$GLB,,,

## 2020-08-24 PROCEDURE — 99999 PR PBB SHADOW E&M-EST. PATIENT-LVL I: ICD-10-PCS | Mod: PBBFAC,,,

## 2020-08-24 PROCEDURE — 99403 PR PREVENT COUNSEL,INDIV,45 MIN: ICD-10-PCS | Mod: S$GLB,,,

## 2020-08-24 NOTE — PROGRESS NOTES
Individual Follow-Up Form    8/24/2020    Quit Date: 8/15/2020    Clinical Status of Patient: Outpatient via telephonic call    Length of Service: 45 minutes    Continuing Medication: no    Other Medications: none     Target Symptoms: Withdrawal and medication side effects. The following were  rated moderate (3) to severe (4) on TCRS:  · Moderate (3): desires and craves nicotine, headaches, restless; reviewed with patient  · Severe (4): none    Comments: Followed up with patient via telephone call. Pt reports being tobacco free for 8 days now! Congratulated pt on hard work and success. Pt stated he completely put everything down, the cigarettes, patches and Chantix. He started having headaches and wasn't being consistent with Chantix so he just stopped taking it. Pt stated he was smoking about half a pack while using 21 mg patch, informed pt it was not the Chantix or patches giving him a headache, it was the cigarettes and all the nicotine his body was getting. Pt stated his biggest trigger is being around other smokers, so he has been staying away from his brother who smokes. Pt is staying positive and his goal is not to go back to smoking. Reviewed strategies, habitual behavior, high risks situations, understanding urges and cravings, stress and relaxation with open discussion and additional interventions, Introduced lapses, relapses, understanding them and analyzing the situation of a lapse, conflict issues that may be linked to a lapse. The patient denies any abnormal behavioral or mental changes at this time. Will follow up in 2 weeks.      Diagnosis: F17.200    Next Visit: 2 weeks

## 2020-08-24 NOTE — Clinical Note
Followed up with patient. Pt reports being tobacco free for 8 days now! Congratulated pt on hard work and success. Pt stated he completely put everything down, the cigarettes, patches and Chantix. He started having headaches and wasn't being consistent with Chantix. Pt stated he was smoking  half a pack while using 21 mg patch, informed pt it was not the Chantix or patches giving him a headache, it was the cigarettes and all the nicotine. Pt stated his biggest trigger is being around other smokers, so he has been staying away from his brother who smokes. Pt is staying positive and his goal is not to go back to smoking. Reviewed strategies, habitual behavior, high risks situations, understanding urges and cravings, stress and relaxation with open discussion and additional interventions, Introduced lapses, relapses, understanding them and analyzing the situation of a lapse, conflict issues that may be linked to a lapse. Pt denies any abnormal behavioral or mental changes at this time. Will f/u in 2 weeks.

## 2020-09-08 ENCOUNTER — TELEPHONE (OUTPATIENT)
Dept: SMOKING CESSATION | Facility: CLINIC | Age: 60
End: 2020-09-08

## 2020-09-08 NOTE — TELEPHONE ENCOUNTER
2 attempts to contact patient on both numbers listed. Mobile number does not have a voicemail set up, home number is not a working telephone number.

## 2020-09-28 ENCOUNTER — TELEPHONE (OUTPATIENT)
Dept: SMOKING CESSATION | Facility: CLINIC | Age: 60
End: 2020-09-28

## 2020-11-10 ENCOUNTER — PES CALL (OUTPATIENT)
Dept: ADMINISTRATIVE | Facility: CLINIC | Age: 60
End: 2020-11-10

## 2021-02-21 ENCOUNTER — HOSPITAL ENCOUNTER (EMERGENCY)
Facility: HOSPITAL | Age: 61
Discharge: HOME | End: 2021-02-21
Attending: EMERGENCY MEDICINE
Payer: MEDICARE

## 2021-02-21 VITALS
WEIGHT: 156.81 LBS | HEIGHT: 70 IN | DIASTOLIC BLOOD PRESSURE: 65 MMHG | HEART RATE: 60 BPM | RESPIRATION RATE: 14 BRPM | OXYGEN SATURATION: 100 % | SYSTOLIC BLOOD PRESSURE: 108 MMHG | BODY MASS INDEX: 22.45 KG/M2 | TEMPERATURE: 98 F

## 2021-02-21 DIAGNOSIS — R41.82 AMS (ALTERED MENTAL STATUS): ICD-10-CM

## 2021-02-21 DIAGNOSIS — T41.3X1A: Primary | ICD-10-CM

## 2021-02-21 DIAGNOSIS — J18.9 PNEUMONIA OF RIGHT LOWER LOBE DUE TO INFECTIOUS ORGANISM: ICD-10-CM

## 2021-02-21 LAB
ALBUMIN SERPL BCP-MCNC: 3.6 G/DL (ref 3.5–5.2)
ALP SERPL-CCNC: 69 U/L (ref 55–135)
ALT SERPL W/O P-5'-P-CCNC: 63 U/L (ref 10–44)
AMPHET+METHAMPHET UR QL: NORMAL
ANION GAP SERPL CALC-SCNC: 11 MMOL/L (ref 8–16)
AST SERPL-CCNC: 41 U/L (ref 10–40)
BARBITURATES UR QL SCN>200 NG/ML: NEGATIVE
BASOPHILS # BLD AUTO: 0.06 K/UL (ref 0–0.2)
BASOPHILS NFR BLD: 1.1 % (ref 0–1.9)
BENZODIAZ UR QL SCN>200 NG/ML: NORMAL
BILIRUB SERPL-MCNC: 0.5 MG/DL (ref 0.1–1)
BILIRUB UR QL STRIP: NEGATIVE
BUN SERPL-MCNC: 29 MG/DL (ref 8–23)
BZE UR QL SCN: NEGATIVE
CALCIUM SERPL-MCNC: 8.4 MG/DL (ref 8.7–10.5)
CANNABINOIDS UR QL SCN: NEGATIVE
CHLORIDE SERPL-SCNC: 104 MMOL/L (ref 95–110)
CLARITY UR: CLEAR
CO2 SERPL-SCNC: 23 MMOL/L (ref 23–29)
COLOR UR: YELLOW
CREAT SERPL-MCNC: 0.8 MG/DL (ref 0.5–1.4)
CREAT UR-MCNC: 31.8 MG/DL (ref 23–375)
CTP QC/QA: YES
DIFFERENTIAL METHOD: ABNORMAL
EOSINOPHIL # BLD AUTO: 0.3 K/UL (ref 0–0.5)
EOSINOPHIL NFR BLD: 4.7 % (ref 0–8)
ERYTHROCYTE [DISTWIDTH] IN BLOOD BY AUTOMATED COUNT: 13.7 % (ref 11.5–14.5)
EST. GFR  (AFRICAN AMERICAN): >60 ML/MIN/1.73 M^2
EST. GFR  (NON AFRICAN AMERICAN): >60 ML/MIN/1.73 M^2
ETHANOL SERPL-MCNC: <10 MG/DL
GLUCOSE SERPL-MCNC: 92 MG/DL (ref 70–110)
GLUCOSE UR QL STRIP: NEGATIVE
HCT VFR BLD AUTO: 35.9 % (ref 40–54)
HGB BLD-MCNC: 11.6 G/DL (ref 14–18)
HGB UR QL STRIP: NEGATIVE
IMM GRANULOCYTES # BLD AUTO: 0.02 K/UL (ref 0–0.04)
IMM GRANULOCYTES NFR BLD AUTO: 0.4 % (ref 0–0.5)
KETONES UR QL STRIP: NEGATIVE
LEUKOCYTE ESTERASE UR QL STRIP: NEGATIVE
LYMPHOCYTES # BLD AUTO: 2.5 K/UL (ref 1–4.8)
LYMPHOCYTES NFR BLD: 46.3 % (ref 18–48)
MCH RBC QN AUTO: 28 PG (ref 27–31)
MCHC RBC AUTO-ENTMCNC: 32.3 G/DL (ref 32–36)
MCV RBC AUTO: 87 FL (ref 82–98)
METHADONE UR QL SCN>300 NG/ML: NEGATIVE
MONOCYTES # BLD AUTO: 0.7 K/UL (ref 0.3–1)
MONOCYTES NFR BLD: 12.4 % (ref 4–15)
NEUTROPHILS # BLD AUTO: 1.9 K/UL (ref 1.8–7.7)
NEUTROPHILS NFR BLD: 35.1 % (ref 38–73)
NITRITE UR QL STRIP: NEGATIVE
NRBC BLD-RTO: 0 /100 WBC
OPIATES UR QL SCN: NEGATIVE
PCP UR QL SCN>25 NG/ML: NEGATIVE
PH UR STRIP: 6 [PH] (ref 5–8)
PLATELET # BLD AUTO: 241 K/UL (ref 150–350)
PMV BLD AUTO: 10 FL (ref 9.2–12.9)
POCT GLUCOSE: 141 MG/DL (ref 70–110)
POTASSIUM SERPL-SCNC: 3.8 MMOL/L (ref 3.5–5.1)
PROT SERPL-MCNC: 7.4 G/DL (ref 6–8.4)
PROT UR QL STRIP: NEGATIVE
RBC # BLD AUTO: 4.14 M/UL (ref 4.6–6.2)
SARS-COV-2 RDRP RESP QL NAA+PROBE: NEGATIVE
SODIUM SERPL-SCNC: 138 MMOL/L (ref 136–145)
SP GR UR STRIP: 1.01 (ref 1–1.03)
TOXICOLOGY INFORMATION: NORMAL
URN SPEC COLLECT METH UR: NORMAL
UROBILINOGEN UR STRIP-ACNC: NEGATIVE EU/DL
WBC # BLD AUTO: 5.49 K/UL (ref 3.9–12.7)

## 2021-02-21 PROCEDURE — 82077 ASSAY SPEC XCP UR&BREATH IA: CPT

## 2021-02-21 PROCEDURE — 82962 GLUCOSE BLOOD TEST: CPT

## 2021-02-21 PROCEDURE — 25000003 PHARM REV CODE 250: Performed by: EMERGENCY MEDICINE

## 2021-02-21 PROCEDURE — 80307 DRUG TEST PRSMV CHEM ANLYZR: CPT

## 2021-02-21 PROCEDURE — U0002 COVID-19 LAB TEST NON-CDC: HCPCS | Performed by: EMERGENCY MEDICINE

## 2021-02-21 PROCEDURE — 93010 EKG 12-LEAD: ICD-10-PCS | Mod: ,,, | Performed by: INTERNAL MEDICINE

## 2021-02-21 PROCEDURE — 80053 COMPREHEN METABOLIC PANEL: CPT

## 2021-02-21 PROCEDURE — 99285 EMERGENCY DEPT VISIT HI MDM: CPT | Mod: 25

## 2021-02-21 PROCEDURE — 96360 HYDRATION IV INFUSION INIT: CPT

## 2021-02-21 PROCEDURE — 96361 HYDRATE IV INFUSION ADD-ON: CPT

## 2021-02-21 PROCEDURE — 85025 COMPLETE CBC W/AUTO DIFF WBC: CPT

## 2021-02-21 PROCEDURE — 93010 ELECTROCARDIOGRAM REPORT: CPT | Mod: ,,, | Performed by: INTERNAL MEDICINE

## 2021-02-21 PROCEDURE — 81003 URINALYSIS AUTO W/O SCOPE: CPT

## 2021-02-21 PROCEDURE — 93005 ELECTROCARDIOGRAM TRACING: CPT

## 2021-02-21 RX ORDER — DOXYCYCLINE 100 MG/1
100 CAPSULE ORAL 2 TIMES DAILY
Qty: 20 CAPSULE | Refills: 0 | Status: SHIPPED | OUTPATIENT
Start: 2021-02-21 | End: 2021-03-03

## 2021-02-21 RX ADMIN — SODIUM CHLORIDE 1000 ML: 0.9 INJECTION, SOLUTION INTRAVENOUS at 11:02

## 2021-03-03 ENCOUNTER — TELEPHONE (OUTPATIENT)
Dept: ADMINISTRATIVE | Facility: HOSPITAL | Age: 61
End: 2021-03-03

## 2021-03-11 DIAGNOSIS — Z12.89 ENCOUNTER FOR SCREENING FOR MALIGNANT NEOPLASM OF OTHER SITES: ICD-10-CM

## 2021-03-11 DIAGNOSIS — C18.5 MALIGNANT NEOPLASM OF SPLENIC FLEXURE: ICD-10-CM

## 2021-03-15 ENCOUNTER — CLINICAL SUPPORT (OUTPATIENT)
Dept: SMOKING CESSATION | Facility: CLINIC | Age: 61
End: 2021-03-15
Payer: COMMERCIAL

## 2021-03-15 DIAGNOSIS — F17.200 NICOTINE DEPENDENCE: Primary | ICD-10-CM

## 2021-03-15 PROCEDURE — 99407 BEHAV CHNG SMOKING > 10 MIN: CPT | Mod: S$GLB,,,

## 2021-03-15 PROCEDURE — 99407 PR TOBACCO USE CESSATION INTENSIVE >10 MINUTES: ICD-10-PCS | Mod: S$GLB,,,

## 2021-04-15 ENCOUNTER — TELEPHONE (OUTPATIENT)
Dept: HEMATOLOGY/ONCOLOGY | Facility: CLINIC | Age: 61
End: 2021-04-15

## 2022-01-01 NOTE — TELEPHONE ENCOUNTER
----- Message from Trisha Rocha sent at 2022 12:16 PM CDT -----  Caller is requesting a call back in regards to getting pt schedule for  check. Pt can be reached at [phone] (tyse)      Patient is asking for these medications to be filled by you one more time. He stated that he is in the process of getting a new pain management doctor to where he can start weaning off this medication. He is hoping to get scheduled with that doctor next month.

## 2022-01-25 ENCOUNTER — PATIENT OUTREACH (OUTPATIENT)
Dept: ADMINISTRATIVE | Facility: HOSPITAL | Age: 62
End: 2022-01-25
Payer: MEDICARE

## 2022-01-25 NOTE — PROGRESS NOTES
Working [employer] Report:     Reviewed chart for medication reconciliation post hospital discharge in 2021.

## 2022-03-02 ENCOUNTER — OFFICE VISIT (OUTPATIENT)
Dept: HEMATOLOGY/ONCOLOGY | Facility: CLINIC | Age: 62
End: 2022-03-02
Payer: MEDICARE

## 2022-03-02 VITALS
OXYGEN SATURATION: 98 % | TEMPERATURE: 98 F | SYSTOLIC BLOOD PRESSURE: 117 MMHG | DIASTOLIC BLOOD PRESSURE: 81 MMHG | WEIGHT: 153.69 LBS | HEART RATE: 86 BPM | BODY MASS INDEX: 22.05 KG/M2

## 2022-03-02 DIAGNOSIS — D49.89 NEOPLASM OF ABDOMEN: ICD-10-CM

## 2022-03-02 DIAGNOSIS — B18.2 CHRONIC HEPATITIS C WITHOUT HEPATIC COMA: ICD-10-CM

## 2022-03-02 DIAGNOSIS — C18.5 MALIGNANT NEOPLASM OF SPLENIC FLEXURE: Primary | ICD-10-CM

## 2022-03-02 DIAGNOSIS — R97.20 ELEVATED PROSTATE SPECIFIC ANTIGEN (PSA): ICD-10-CM

## 2022-03-02 DIAGNOSIS — J43.1 PANLOBULAR EMPHYSEMA: ICD-10-CM

## 2022-03-02 PROCEDURE — 1160F PR REVIEW ALL MEDS BY PRESCRIBER/CLIN PHARMACIST DOCUMENTED: ICD-10-PCS | Mod: CPTII,S$GLB,, | Performed by: INTERNAL MEDICINE

## 2022-03-02 PROCEDURE — 99999 PR PBB SHADOW E&M-EST. PATIENT-LVL IV: CPT | Mod: PBBFAC,,, | Performed by: INTERNAL MEDICINE

## 2022-03-02 PROCEDURE — 3079F PR MOST RECENT DIASTOLIC BLOOD PRESSURE 80-89 MM HG: ICD-10-PCS | Mod: CPTII,S$GLB,, | Performed by: INTERNAL MEDICINE

## 2022-03-02 PROCEDURE — 3008F BODY MASS INDEX DOCD: CPT | Mod: CPTII,S$GLB,, | Performed by: INTERNAL MEDICINE

## 2022-03-02 PROCEDURE — 1159F MED LIST DOCD IN RCRD: CPT | Mod: CPTII,S$GLB,, | Performed by: INTERNAL MEDICINE

## 2022-03-02 PROCEDURE — 3074F PR MOST RECENT SYSTOLIC BLOOD PRESSURE < 130 MM HG: ICD-10-PCS | Mod: CPTII,S$GLB,, | Performed by: INTERNAL MEDICINE

## 2022-03-02 PROCEDURE — 3008F PR BODY MASS INDEX (BMI) DOCUMENTED: ICD-10-PCS | Mod: CPTII,S$GLB,, | Performed by: INTERNAL MEDICINE

## 2022-03-02 PROCEDURE — 99999 PR PBB SHADOW E&M-EST. PATIENT-LVL IV: ICD-10-PCS | Mod: PBBFAC,,, | Performed by: INTERNAL MEDICINE

## 2022-03-02 PROCEDURE — 1159F PR MEDICATION LIST DOCUMENTED IN MEDICAL RECORD: ICD-10-PCS | Mod: CPTII,S$GLB,, | Performed by: INTERNAL MEDICINE

## 2022-03-02 PROCEDURE — 1160F RVW MEDS BY RX/DR IN RCRD: CPT | Mod: CPTII,S$GLB,, | Performed by: INTERNAL MEDICINE

## 2022-03-02 PROCEDURE — 3074F SYST BP LT 130 MM HG: CPT | Mod: CPTII,S$GLB,, | Performed by: INTERNAL MEDICINE

## 2022-03-02 PROCEDURE — 99215 OFFICE O/P EST HI 40 MIN: CPT | Mod: S$GLB,,, | Performed by: INTERNAL MEDICINE

## 2022-03-02 PROCEDURE — 99215 PR OFFICE/OUTPT VISIT, EST, LEVL V, 40-54 MIN: ICD-10-PCS | Mod: S$GLB,,, | Performed by: INTERNAL MEDICINE

## 2022-03-02 PROCEDURE — 3079F DIAST BP 80-89 MM HG: CPT | Mod: CPTII,S$GLB,, | Performed by: INTERNAL MEDICINE

## 2022-03-02 RX ORDER — HYDROCODONE BITARTRATE AND ACETAMINOPHEN 10; 325 MG/1; MG/1
1 TABLET ORAL EVERY 6 HOURS PRN
COMMUNITY
Start: 2022-02-27 | End: 2022-03-06

## 2022-03-02 RX ORDER — FOLIC ACID 1 MG/1
1000 TABLET ORAL DAILY
COMMUNITY
Start: 2021-12-31

## 2022-03-02 NOTE — PROGRESS NOTES
Subjective:       Patient ID: Beltran Cruz is a 61 y.o. male.    Chief Complaint: Pain and Colon Cancer    HPI 61-year-old male history of stage I colon carcinoma patient states that he fell from a ladder trying to repair his house from [employer].  Patient reports pain involving his right shoulder was seen in the emergency room at [facility] with MRI of thoracic and lumbar spine with areas in T12-L1 concerning for metastatic disease patient presents today with sister for further evaluation ECOG status 2; patient last seen in March of 2020. Prior to pandemic     Past Medical History:   Diagnosis Date    Back pain     Cancer     Colon    Chronic hepatitis C     Chronic obstructive pulmonary disease 8/9/2016    Colon cancer 10/2016    T1 N0    Colon polyps     Colonoscopy 9/6/2016    Diverticulosis     Colonoscopy 9/6/2016    Drug overdose, intentional 03/22/2008    benzo,opiates, alcohol, asa , acetomenopjen    Eye injury     right eye    Hemorrhoids     Colonoscopy 9/6/2016    Screening PSA (prostate specific antigen) 8/24/2016     Family History   Problem Relation Age of Onset    Retinal detachment Brother     Hypertension Brother      Social History     Socioeconomic History    Marital status:    Tobacco Use    Smoking status: Current Every Day Smoker     Packs/day: 1.00     Years: 42.00     Pack years: 42.00     Types: Cigarettes    Smokeless tobacco: Never Used    Tobacco comment: no smoking after 12 midnight prior to surgery   Substance and Sexual Activity    Alcohol use: Not Currently     Comment: social    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     Past Surgical History:   Procedure Laterality Date    ANKLE SURGERY      APPENDECTOMY      BACK SURGERY      COLONOSCOPY N/A 9/6/2016    Procedure: COLONOSCOPY;  Surgeon: Jimbo Farrell MD;  Location: [facility];  Service: Endoscopy;  Laterality: N/A;    COLONOSCOPY N/A 7/17/2017    Procedure:  COLONOSCOPY;  Surgeon: Jimbo Farrell MD;  Location: [facility];  Service: Endoscopy;  Laterality: N/A;    HEMICOLECTOMY  10/4/ 2016    laprascopic for colon ca    SHOULDER SURGERY         Labs:  Lab Results   Component Value Date    WBC 5.49 02/21/2021    HGB 11.6 (L) 02/21/2021    HCT 35.9 (L) 02/21/2021    MCV 87 02/21/2021     02/21/2021     BMP  Lab Results   Component Value Date     02/21/2021    K 3.8 02/21/2021     02/21/2021    CO2 23 02/21/2021    BUN 29 (H) 02/21/2021    CREATININE 0.8 02/21/2021    CALCIUM 8.4 (L) 02/21/2021    ANIONGAP 11 02/21/2021    ESTGFRAFRICA >60 02/21/2021    EGFRNONAA >60 02/21/2021     Lab Results   Component Value Date    ALT 63 (H) 02/21/2021    AST 41 (H) 02/21/2021    GGT 39 08/31/2016    ALKPHOS 69 02/21/2021    BILITOT 0.5 02/21/2021       No results found for: IRON, TIBC, FERRITIN, SATURATEDIRO  No results found for: [id number]  No results found for: FOLATE  No results found for: TSH      Review of Systems   Constitutional: Positive for fatigue. Negative for activity change, appetite change, chills, diaphoresis, fever and unexpected weight change.   HENT: Negative for congestion, dental problem, drooling, ear discharge, ear pain, facial swelling, hearing loss, mouth sores, nosebleeds, postnasal drip, rhinorrhea, sinus pressure, sneezing, sore throat, tinnitus, trouble swallowing and voice change.    Eyes: Negative for photophobia, pain, discharge, redness, itching and visual disturbance.   Respiratory: Negative for apnea, cough, choking, chest tightness, shortness of breath, wheezing and stridor.    Cardiovascular: Negative for chest pain, palpitations and leg swelling.   Gastrointestinal: Negative for abdominal distention, abdominal pain, anal bleeding, blood in stool, constipation, diarrhea, nausea, rectal pain and vomiting.   Endocrine: Negative for cold intolerance, heat intolerance, polydipsia, polyphagia and polyuria.   Genitourinary:  Negative for decreased urine volume, difficulty urinating, dysuria, enuresis, flank pain, frequency, genital sores, hematuria, penile discharge, penile pain, penile swelling, scrotal swelling, testicular pain and urgency.   Musculoskeletal: Positive for back pain. Negative for arthralgias, gait problem, joint swelling, myalgias, neck pain and neck stiffness.   Skin: Negative for color change, pallor, rash and wound.   Allergic/Immunologic: Negative for environmental allergies, food allergies and immunocompromised state.   Neurological: Positive for weakness. Negative for dizziness, tremors, seizures, syncope, facial asymmetry, speech difficulty, light-headedness, numbness and headaches.   Hematological: Negative for adenopathy. Does not bruise/bleed easily.   Psychiatric/Behavioral: Positive for dysphoric mood. Negative for agitation, behavioral problems, confusion, decreased concentration, hallucinations, self-injury, sleep disturbance and suicidal ideas. The patient is nervous/anxious. The patient is not hyperactive.        Objective:      Physical Exam  Vitals reviewed.   Constitutional:       General: He is not in acute distress.     Appearance: He is well-developed. He is ill-appearing. He is not diaphoretic.   HENT:      Head: Normocephalic.      Right Ear: External ear normal.      Left Ear: External ear normal.      Nose: Nose normal.      Right Sinus: No maxillary sinus tenderness or frontal sinus tenderness.      Left Sinus: No maxillary sinus tenderness or frontal sinus tenderness.      Mouth/Throat:      Pharynx: No oropharyngeal exudate.   Eyes:      General: Lids are normal. No scleral icterus.        Right eye: No discharge.         Left eye: No discharge.      Extraocular Movements:      Right eye: Normal extraocular motion.      Left eye: Normal extraocular motion.      Conjunctiva/sclera:      Right eye: Right conjunctiva is not injected. No hemorrhage.     Left eye: Left conjunctiva is not  injected. No hemorrhage.     Pupils: Pupils are equal, round, and reactive to light.   Neck:      Thyroid: No thyromegaly.      Vascular: No JVD.      Trachea: No tracheal deviation.   Cardiovascular:      Rate and Rhythm: Normal rate and regular rhythm.      Heart sounds: Normal heart sounds.   Pulmonary:      Effort: Pulmonary effort is normal. No respiratory distress.      Breath sounds: Normal breath sounds. No stridor.   Chest:   Breasts:      Right: No supraclavicular adenopathy.      Left: No supraclavicular adenopathy.       Abdominal:      General: Bowel sounds are normal.      Palpations: Abdomen is soft. There is no hepatomegaly, splenomegaly or mass.      Tenderness: There is no abdominal tenderness.   Musculoskeletal:         General: No tenderness. Normal range of motion.      Cervical back: Normal range of motion and neck supple.   Lymphadenopathy:      Head:      Right side of head: No posterior auricular or occipital adenopathy.      Left side of head: No posterior auricular or occipital adenopathy.      Cervical: No cervical adenopathy.      Right cervical: No superficial, deep or posterior cervical adenopathy.     Left cervical: No superficial, deep or posterior cervical adenopathy.      Upper Body:      Right upper body: No supraclavicular adenopathy.      Left upper body: No supraclavicular adenopathy.   Skin:     General: Skin is dry.      Findings: No erythema or rash.      Nails: There is no clubbing.   Neurological:      Mental Status: He is alert and oriented to person, place, and time.      Cranial Nerves: No cranial nerve deficit.      Motor: Weakness present.      Coordination: Coordination abnormal.      Gait: Gait abnormal.   Psychiatric:         Behavior: Behavior normal.         Thought Content: Thought content normal.         Judgment: Judgment normal.             Assessment:      1. Malignant neoplasm of splenic flexure    2. Neoplasm of abdomen    3. Elevated prostate specific  antigen (PSA)     4. Chronic hepatitis C without hepatic coma    5. Panlobular emphysema           Plan:     History of stage I colon carcinoma with recent trauma previous history of substance abuse initially ask for pain medicines told not to do so.  At this time sister agrees will proceed with CT chest abdomen pelvis laboratory studies and return after was for review discussed implications answered questions with him orders written reviewed; reviewed report of MRI with conclusion stating that as potential metastatic disease but in body of dictation no mention of this.  I have asked that the images be brought to us to be placed into the electronic record for our review        Jorge Avendano Jr, MD FACP

## 2022-03-03 ENCOUNTER — HOSPITAL ENCOUNTER (OUTPATIENT)
Dept: RADIOLOGY | Facility: HOSPITAL | Age: 62
Discharge: HOME | End: 2022-03-03
Attending: INTERNAL MEDICINE
Payer: MEDICARE

## 2022-03-03 DIAGNOSIS — D49.89 NEOPLASM OF ABDOMEN: ICD-10-CM

## 2022-03-03 DIAGNOSIS — C18.5 MALIGNANT NEOPLASM OF SPLENIC FLEXURE: ICD-10-CM

## 2022-03-03 PROCEDURE — 74177 CT ABD & PELVIS W/CONTRAST: CPT | Mod: TC

## 2022-03-03 PROCEDURE — 71260 CT THORAX DX C+: CPT | Mod: TC

## 2022-03-03 PROCEDURE — 25500020 PHARM REV CODE 255: Performed by: INTERNAL MEDICINE

## 2022-03-03 PROCEDURE — A9698 NON-RAD CONTRAST MATERIALNOC: HCPCS | Performed by: INTERNAL MEDICINE

## 2022-03-03 RX ADMIN — IOHEXOL 100 ML: 350 INJECTION, SOLUTION INTRAVENOUS at 02:03

## 2022-03-03 RX ADMIN — IOHEXOL 1000 ML: 9 SOLUTION ORAL at 02:03

## 2022-03-04 ENCOUNTER — TELEPHONE (OUTPATIENT)
Dept: HEMATOLOGY/ONCOLOGY | Facility: CLINIC | Age: 62
End: 2022-03-04
Payer: MEDICARE

## 2022-03-04 NOTE — TELEPHONE ENCOUNTER
----- Message from Nabeel Sanchez sent at 3/4/2022  2:05 PM CST -----  Contact: PT  PT is calling to see the results of his scan. Call back at [phone].

## 2022-03-04 NOTE — TELEPHONE ENCOUNTER
Spoke to patient confirmed his appointment with Dr urias on 3/11/22, for him to discuss his results.

## 2022-03-08 ENCOUNTER — TELEPHONE (OUTPATIENT)
Dept: HEMATOLOGY/ONCOLOGY | Facility: CLINIC | Age: 62
End: 2022-03-08
Payer: MEDICARE

## 2022-03-08 DIAGNOSIS — C34.90 MALIGNANT NEOPLASM OF UNSPECIFIED PART OF UNSPECIFIED BRONCHUS OR LUNG: ICD-10-CM

## 2022-03-08 DIAGNOSIS — C34.12 MALIGNANT NEOPLASM OF UPPER LOBE, LEFT BRONCHUS OR LUNG: ICD-10-CM

## 2022-03-08 NOTE — TELEPHONE ENCOUNTER
Received communication from Dr. Avendano to have Ct CAP cancelled for 3/10/22 and set up PET scan and follow up with dR Avendano ASAP     Pet scan 3/9/22 945am at the cancer center  Dr. Avendano follow up 3/11/22 at the [place] location    Spoke with pt over the phone today and reviewed upcoming appts. Pt will fast for 6 hours prior to PET scan with only water to drink up until the time of the test. Prep reviewed and pt stated understanding.  Pt in agreement with appts scheduled and will not come for CT that was set for 3/10/22  Pt knows to call with any needs meanwhile.

## 2022-03-09 ENCOUNTER — PATIENT MESSAGE (OUTPATIENT)
Dept: HEMATOLOGY/ONCOLOGY | Facility: CLINIC | Age: 62
End: 2022-03-09
Payer: MEDICARE

## 2022-03-09 ENCOUNTER — HOSPITAL ENCOUNTER (OUTPATIENT)
Dept: RADIOLOGY | Facility: HOSPITAL | Age: 62
Discharge: HOME | End: 2022-03-09
Attending: INTERNAL MEDICINE
Payer: MEDICARE

## 2022-03-09 DIAGNOSIS — C34.12 MALIGNANT NEOPLASM OF UPPER LOBE, LEFT BRONCHUS OR LUNG: ICD-10-CM

## 2022-03-09 DIAGNOSIS — C34.90 MALIGNANT NEOPLASM OF UNSPECIFIED PART OF UNSPECIFIED BRONCHUS OR LUNG: ICD-10-CM

## 2022-03-09 PROCEDURE — 78815 PET IMAGE W/CT SKULL-THIGH: CPT | Mod: PI,TC

## 2022-03-09 PROCEDURE — 78815 PET IMAGE W/CT SKULL-THIGH: CPT | Mod: 26,PI,, | Performed by: RADIOLOGY

## 2022-03-09 PROCEDURE — 78815 NM PET CT ROUTINE: ICD-10-PCS | Mod: 26,PI,, | Performed by: RADIOLOGY

## 2022-03-09 NOTE — TELEPHONE ENCOUNTER
No evidence of underlying cancer here except for the areas in the spine which we will need to investigate further

## 2022-03-11 ENCOUNTER — OFFICE VISIT (OUTPATIENT)
Dept: HEMATOLOGY/ONCOLOGY | Facility: CLINIC | Age: 62
End: 2022-03-11
Payer: MEDICARE

## 2022-03-11 ENCOUNTER — TELEPHONE (OUTPATIENT)
Dept: PAIN MEDICINE | Facility: CLINIC | Age: 62
End: 2022-03-11
Payer: MEDICARE

## 2022-03-11 ENCOUNTER — LAB VISIT (OUTPATIENT)
Dept: LAB | Facility: HOSPITAL | Age: 62
End: 2022-03-11
Attending: INTERNAL MEDICINE
Payer: MEDICARE

## 2022-03-11 VITALS
DIASTOLIC BLOOD PRESSURE: 76 MMHG | HEIGHT: 70 IN | HEART RATE: 72 BPM | TEMPERATURE: 97 F | SYSTOLIC BLOOD PRESSURE: 123 MMHG | WEIGHT: 152.13 LBS | BODY MASS INDEX: 21.78 KG/M2 | OXYGEN SATURATION: 99 %

## 2022-03-11 DIAGNOSIS — R52 PAIN MANAGEMENT: ICD-10-CM

## 2022-03-11 DIAGNOSIS — C18.5 MALIGNANT NEOPLASM OF SPLENIC FLEXURE: ICD-10-CM

## 2022-03-11 DIAGNOSIS — D49.89 NEOPLASM OF ABDOMEN: ICD-10-CM

## 2022-03-11 DIAGNOSIS — C34.90 MALIGNANT NEOPLASM OF UNSPECIFIED PART OF UNSPECIFIED BRONCHUS OR LUNG: Primary | ICD-10-CM

## 2022-03-11 DIAGNOSIS — R97.20 ELEVATED PROSTATE SPECIFIC ANTIGEN (PSA): ICD-10-CM

## 2022-03-11 LAB
ALBUMIN SERPL BCP-MCNC: 3.8 G/DL (ref 3.5–5.2)
ALP SERPL-CCNC: 88 U/L (ref 55–135)
ALT SERPL W/O P-5'-P-CCNC: 123 U/L (ref 10–44)
ANION GAP SERPL CALC-SCNC: 8 MMOL/L (ref 8–16)
AST SERPL-CCNC: 92 U/L (ref 10–40)
BASOPHILS # BLD AUTO: 0.05 K/UL (ref 0–0.2)
BASOPHILS NFR BLD: 1 % (ref 0–1.9)
BILIRUB SERPL-MCNC: 0.4 MG/DL (ref 0.1–1)
BUN SERPL-MCNC: 21 MG/DL (ref 8–23)
CALCIUM SERPL-MCNC: 9 MG/DL (ref 8.7–10.5)
CHLORIDE SERPL-SCNC: 104 MMOL/L (ref 95–110)
CO2 SERPL-SCNC: 25 MMOL/L (ref 23–29)
COMPLEXED PSA SERPL-MCNC: 0.2 NG/ML (ref 0–4)
CREAT SERPL-MCNC: 0.9 MG/DL (ref 0.5–1.4)
DIFFERENTIAL METHOD: ABNORMAL
EOSINOPHIL # BLD AUTO: 0.3 K/UL (ref 0–0.5)
EOSINOPHIL NFR BLD: 5.9 % (ref 0–8)
ERYTHROCYTE [DISTWIDTH] IN BLOOD BY AUTOMATED COUNT: 13.7 % (ref 11.5–14.5)
ERYTHROCYTE [SEDIMENTATION RATE] IN BLOOD BY WESTERGREN METHOD: 30 MM/HR (ref 0–23)
EST. GFR  (AFRICAN AMERICAN): >60 ML/MIN/1.73 M^2
EST. GFR  (NON AFRICAN AMERICAN): >60 ML/MIN/1.73 M^2
FERRITIN SERPL-MCNC: 162 NG/ML (ref 20–300)
GLUCOSE SERPL-MCNC: 65 MG/DL (ref 70–110)
HCT VFR BLD AUTO: 41.1 % (ref 40–54)
HGB BLD-MCNC: 12.8 G/DL (ref 14–18)
IMM GRANULOCYTES # BLD AUTO: 0.01 K/UL (ref 0–0.04)
IMM GRANULOCYTES NFR BLD AUTO: 0.2 % (ref 0–0.5)
IRON SERPL-MCNC: 56 UG/DL (ref 45–160)
LDH SERPL L TO P-CCNC: 225 U/L (ref 110–260)
LYMPHOCYTES # BLD AUTO: 2.5 K/UL (ref 1–4.8)
LYMPHOCYTES NFR BLD: 51.6 % (ref 18–48)
MCH RBC QN AUTO: 28.8 PG (ref 27–31)
MCHC RBC AUTO-ENTMCNC: 31.1 G/DL (ref 32–36)
MCV RBC AUTO: 93 FL (ref 82–98)
MONOCYTES # BLD AUTO: 0.5 K/UL (ref 0.3–1)
MONOCYTES NFR BLD: 10.2 % (ref 4–15)
NEUTROPHILS # BLD AUTO: 1.5 K/UL (ref 1.8–7.7)
NEUTROPHILS NFR BLD: 31.1 % (ref 38–73)
NRBC BLD-RTO: 0 /100 WBC
PLATELET # BLD AUTO: 204 K/UL (ref 150–450)
PMV BLD AUTO: 10 FL (ref 9.2–12.9)
POTASSIUM SERPL-SCNC: 4.2 MMOL/L (ref 3.5–5.1)
PROT SERPL-MCNC: 8 G/DL (ref 6–8.4)
RBC # BLD AUTO: 4.44 M/UL (ref 4.6–6.2)
SATURATED IRON: 13 % (ref 20–50)
SODIUM SERPL-SCNC: 137 MMOL/L (ref 136–145)
TOTAL IRON BINDING CAPACITY: 419 UG/DL (ref 250–450)
TRANSFERRIN SERPL-MCNC: 283 MG/DL (ref 200–375)
WBC # BLD AUTO: 4.9 K/UL (ref 3.9–12.7)

## 2022-03-11 PROCEDURE — 1159F MED LIST DOCD IN RCRD: CPT | Mod: CPTII,S$GLB,, | Performed by: INTERNAL MEDICINE

## 2022-03-11 PROCEDURE — 99214 OFFICE O/P EST MOD 30 MIN: CPT | Mod: S$GLB,,, | Performed by: INTERNAL MEDICINE

## 2022-03-11 PROCEDURE — 99999 PR PBB SHADOW E&M-EST. PATIENT-LVL V: CPT | Mod: PBBFAC,,, | Performed by: INTERNAL MEDICINE

## 2022-03-11 PROCEDURE — 99214 PR OFFICE/OUTPT VISIT, EST, LEVL IV, 30-39 MIN: ICD-10-PCS | Mod: S$GLB,,, | Performed by: INTERNAL MEDICINE

## 2022-03-11 PROCEDURE — 1159F PR MEDICATION LIST DOCUMENTED IN MEDICAL RECORD: ICD-10-PCS | Mod: CPTII,S$GLB,, | Performed by: INTERNAL MEDICINE

## 2022-03-11 PROCEDURE — 3074F SYST BP LT 130 MM HG: CPT | Mod: CPTII,S$GLB,, | Performed by: INTERNAL MEDICINE

## 2022-03-11 PROCEDURE — 1160F RVW MEDS BY RX/DR IN RCRD: CPT | Mod: CPTII,S$GLB,, | Performed by: INTERNAL MEDICINE

## 2022-03-11 PROCEDURE — 85025 COMPLETE CBC W/AUTO DIFF WBC: CPT | Performed by: INTERNAL MEDICINE

## 2022-03-11 PROCEDURE — 82378 CARCINOEMBRYONIC ANTIGEN: CPT | Performed by: INTERNAL MEDICINE

## 2022-03-11 PROCEDURE — 83615 LACTATE (LD) (LDH) ENZYME: CPT | Performed by: INTERNAL MEDICINE

## 2022-03-11 PROCEDURE — 84153 ASSAY OF PSA TOTAL: CPT | Performed by: INTERNAL MEDICINE

## 2022-03-11 PROCEDURE — 3078F DIAST BP <80 MM HG: CPT | Mod: CPTII,S$GLB,, | Performed by: INTERNAL MEDICINE

## 2022-03-11 PROCEDURE — 84165 PATHOLOGIST INTERPRETATION SPE: ICD-10-PCS | Mod: 26,,, | Performed by: PATHOLOGY

## 2022-03-11 PROCEDURE — 36415 COLL VENOUS BLD VENIPUNCTURE: CPT | Performed by: INTERNAL MEDICINE

## 2022-03-11 PROCEDURE — 84165 PROTEIN E-PHORESIS SERUM: CPT | Mod: 26,,, | Performed by: PATHOLOGY

## 2022-03-11 PROCEDURE — 85652 RBC SED RATE AUTOMATED: CPT | Performed by: INTERNAL MEDICINE

## 2022-03-11 PROCEDURE — 99999 PR PBB SHADOW E&M-EST. PATIENT-LVL V: ICD-10-PCS | Mod: PBBFAC,,, | Performed by: INTERNAL MEDICINE

## 2022-03-11 PROCEDURE — 82728 ASSAY OF FERRITIN: CPT | Performed by: INTERNAL MEDICINE

## 2022-03-11 PROCEDURE — 1160F PR REVIEW ALL MEDS BY PRESCRIBER/CLIN PHARMACIST DOCUMENTED: ICD-10-PCS | Mod: CPTII,S$GLB,, | Performed by: INTERNAL MEDICINE

## 2022-03-11 PROCEDURE — 3008F BODY MASS INDEX DOCD: CPT | Mod: CPTII,S$GLB,, | Performed by: INTERNAL MEDICINE

## 2022-03-11 PROCEDURE — 80053 COMPREHEN METABOLIC PANEL: CPT | Performed by: INTERNAL MEDICINE

## 2022-03-11 PROCEDURE — 84165 PROTEIN E-PHORESIS SERUM: CPT | Performed by: INTERNAL MEDICINE

## 2022-03-11 PROCEDURE — 83520 IMMUNOASSAY QUANT NOS NONAB: CPT | Performed by: INTERNAL MEDICINE

## 2022-03-11 PROCEDURE — 3074F PR MOST RECENT SYSTOLIC BLOOD PRESSURE < 130 MM HG: ICD-10-PCS | Mod: CPTII,S$GLB,, | Performed by: INTERNAL MEDICINE

## 2022-03-11 PROCEDURE — 3078F PR MOST RECENT DIASTOLIC BLOOD PRESSURE < 80 MM HG: ICD-10-PCS | Mod: CPTII,S$GLB,, | Performed by: INTERNAL MEDICINE

## 2022-03-11 PROCEDURE — 84466 ASSAY OF TRANSFERRIN: CPT | Performed by: INTERNAL MEDICINE

## 2022-03-11 PROCEDURE — 3008F PR BODY MASS INDEX (BMI) DOCUMENTED: ICD-10-PCS | Mod: CPTII,S$GLB,, | Performed by: INTERNAL MEDICINE

## 2022-03-11 PROCEDURE — 87389 HIV-1 AG W/HIV-1&-2 AB AG IA: CPT | Performed by: INTERNAL MEDICINE

## 2022-03-11 NOTE — PROGRESS NOTES
Subjective:       Patient ID: Beltran Cruz is a 61 y.o. male.    Chief Complaint: Results and Pain    HPI 61-year-old male with abnormal findings in T12-L1 vertebrae.  Previous lower back surgery chronic pain syndrome currently complains of abnormal pain.  Recent underwent CT chest as well as PET scan.  Patient has been on Suboxone in the past and requesting pain medicine at this point sister accompanies    Past Medical History:   Diagnosis Date    Back pain     Cancer     Colon    Chronic hepatitis C     Chronic obstructive pulmonary disease 8/9/2016    Colon cancer 10/2016    T1 N0    Colon polyps     Colonoscopy 9/6/2016    Diverticulosis     Colonoscopy 9/6/2016    Drug overdose, intentional 03/22/2008    benzo,opiates, alcohol, asa , acetomenopjen    Eye injury     right eye    Hemorrhoids     Colonoscopy 9/6/2016    Screening PSA (prostate specific antigen) 8/24/2016     Family History   Problem Relation Age of Onset    Retinal detachment Brother     Hypertension Brother      Social History     Socioeconomic History    Marital status:    Tobacco Use    Smoking status: Current Every Day Smoker     Packs/day: 1.00     Years: 42.00     Pack years: 42.00     Types: Cigarettes    Smokeless tobacco: Never Used    Tobacco comment: no smoking after 12 midnight prior to surgery   Substance and Sexual Activity    Alcohol use: Not Currently     Comment: social    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     Past Surgical History:   Procedure Laterality Date    ANKLE SURGERY      APPENDECTOMY      BACK SURGERY      COLONOSCOPY N/A 9/6/2016    Procedure: COLONOSCOPY;  Surgeon: Jimbo Farrell MD;  Location: [facility] ENDO;  Service: Endoscopy;  Laterality: N/A;    COLONOSCOPY N/A 7/17/2017    Procedure: COLONOSCOPY;  Surgeon: Jimbo Farrell MD;  Location: [facility] ENDO;  Service: Endoscopy;  Laterality: N/A;    HEMICOLECTOMY  10/4/ 2016    laprascopic for colon ca    SHOULDER SURGERY          Labs:  Lab Results   Component Value Date    WBC 5.49 02/21/2021    HGB 11.6 (L) 02/21/2021    HCT 35.9 (L) 02/21/2021    MCV 87 02/21/2021     02/21/2021     BMP  Lab Results   Component Value Date     02/21/2021    K 3.8 02/21/2021     02/21/2021    CO2 23 02/21/2021    BUN 29 (H) 02/21/2021    CREATININE 0.8 02/21/2021    CALCIUM 8.4 (L) 02/21/2021    ANIONGAP 11 02/21/2021    ESTGFRAFRICA >60 02/21/2021    EGFRNONAA >60 02/21/2021     Lab Results   Component Value Date    ALT 63 (H) 02/21/2021    AST 41 (H) 02/21/2021    GGT 39 08/31/2016    ALKPHOS 69 02/21/2021    BILITOT 0.5 02/21/2021       No results found for: IRON, TIBC, FERRITIN, SATURATEDIRO  No results found for: [id number]  No results found for: FOLATE  No results found for: TSH      Review of Systems   Constitutional: Negative for activity change, appetite change, chills, diaphoresis, fatigue, fever and unexpected weight change.   HENT: Negative for congestion, dental problem, drooling, ear discharge, ear pain, facial swelling, hearing loss, mouth sores, nosebleeds, postnasal drip, rhinorrhea, sinus pressure, sneezing, sore throat, tinnitus, trouble swallowing and voice change.    Eyes: Negative for photophobia, pain, discharge, redness, itching and visual disturbance.   Respiratory: Negative for apnea, cough, choking, chest tightness, shortness of breath, wheezing and stridor.    Cardiovascular: Negative for chest pain, palpitations and leg swelling.   Gastrointestinal: Negative for abdominal distention, abdominal pain, anal bleeding, blood in stool, constipation, diarrhea, nausea, rectal pain and vomiting.   Endocrine: Negative for cold intolerance, heat intolerance, polydipsia, polyphagia and polyuria.   Genitourinary: Negative for decreased urine volume, difficulty urinating, dysuria, enuresis, flank pain, frequency, genital sores, hematuria, penile discharge, penile pain, penile swelling, scrotal swelling, testicular  pain and urgency.   Musculoskeletal: Positive for back pain. Negative for arthralgias, gait problem, joint swelling, myalgias, neck pain and neck stiffness.   Skin: Negative for color change, pallor, rash and wound.   Allergic/Immunologic: Negative for environmental allergies, food allergies and immunocompromised state.   Neurological: Negative for dizziness, tremors, seizures, syncope, facial asymmetry, speech difficulty, weakness, light-headedness, numbness and headaches.   Hematological: Negative for adenopathy. Does not bruise/bleed easily.   Psychiatric/Behavioral: Positive for dysphoric mood. Negative for agitation, behavioral problems, confusion, decreased concentration, hallucinations, self-injury, sleep disturbance and suicidal ideas. The patient is nervous/anxious. The patient is not hyperactive.        Objective:      Physical Exam  Vitals reviewed.   Constitutional:       General: He is not in acute distress.     Appearance: He is well-developed. He is ill-appearing. He is not diaphoretic.   HENT:      Head: Normocephalic.      Right Ear: External ear normal.      Left Ear: External ear normal.      Nose: Nose normal.      Right Sinus: No maxillary sinus tenderness or frontal sinus tenderness.      Left Sinus: No maxillary sinus tenderness or frontal sinus tenderness.      Mouth/Throat:      Pharynx: No oropharyngeal exudate.   Eyes:      General: Lids are normal. No scleral icterus.        Right eye: No discharge.         Left eye: No discharge.      Extraocular Movements:      Right eye: Normal extraocular motion.      Left eye: Normal extraocular motion.      Conjunctiva/sclera:      Right eye: Right conjunctiva is not injected. No hemorrhage.     Left eye: Left conjunctiva is not injected. No hemorrhage.     Pupils: Pupils are equal, round, and reactive to light.   Neck:      Thyroid: No thyromegaly.      Vascular: No JVD.      Trachea: No tracheal deviation.   Cardiovascular:      Rate and Rhythm:  Normal rate.   Pulmonary:      Effort: Pulmonary effort is normal. No respiratory distress.      Breath sounds: No stridor.   Chest:   Breasts:      Right: No supraclavicular adenopathy.      Left: No supraclavicular adenopathy.       Abdominal:      General: Bowel sounds are normal.      Palpations: Abdomen is soft. There is no hepatomegaly, splenomegaly or mass.      Tenderness: There is no abdominal tenderness.   Musculoskeletal:         General: No tenderness. Normal range of motion.      Cervical back: Normal range of motion and neck supple.   Lymphadenopathy:      Head:      Right side of head: No posterior auricular or occipital adenopathy.      Left side of head: No posterior auricular or occipital adenopathy.      Cervical: No cervical adenopathy.      Right cervical: No superficial, deep or posterior cervical adenopathy.     Left cervical: No superficial, deep or posterior cervical adenopathy.      Upper Body:      Right upper body: No supraclavicular adenopathy.      Left upper body: No supraclavicular adenopathy.   Skin:     General: Skin is dry.      Findings: No erythema or rash.      Nails: There is no clubbing.   Neurological:      Mental Status: He is alert and oriented to person, place, and time.      Cranial Nerves: No cranial nerve deficit.      Coordination: Coordination normal.   Psychiatric:         Behavior: Behavior normal.         Thought Content: Thought content normal.         Judgment: Judgment normal.             Assessment:      1. Malignant neoplasm of unspecified part of unspecified bronchus or lung    2. Pain management           Plan:     Reviewed abnormal area in back.  There is no clear evidence of metastatic lung cancer but lytic lesion.  Laboratory studies ordered today communicate through portal of ask that Interventional Radiology evaluate for possible CT-directed biopsy.  Will see back 7 days later in terms of his pain management I do not feel comfortable at this time in a  person who has previously been on Suboxone a would recommend that he be evaluated by pain management for control of pain does not have documented malignancy at the present time        Jorge Avendano Jr, MD FACP

## 2022-03-11 NOTE — TELEPHONE ENCOUNTER
"Attempted to contact Pt, voicemail not set up.         Per  last "previous history of substance abuse initially ask for pain medicines told not to do so"  "

## 2022-03-12 LAB — CEA SERPL-MCNC: <1.7 NG/ML (ref 0–5)

## 2022-03-14 LAB
ALBUMIN SERPL ELPH-MCNC: 3.71 G/DL (ref 3.35–5.55)
ALPHA1 GLOB SERPL ELPH-MCNC: 0.26 G/DL (ref 0.17–0.41)
ALPHA2 GLOB SERPL ELPH-MCNC: 0.96 G/DL (ref 0.43–0.99)
B-GLOBULIN SERPL ELPH-MCNC: 0.76 G/DL (ref 0.5–1.1)
GAMMA GLOB SERPL ELPH-MCNC: 1.82 G/DL (ref 0.67–1.58)
HIV 1+2 AB+HIV1 P24 AG SERPL QL IA: NEGATIVE
KAPPA LC SER QL IA: 5.23 MG/DL (ref 0.33–1.94)
KAPPA LC/LAMBDA SER IA: 1.8 (ref 0.26–1.65)
LAMBDA LC SER QL IA: 2.91 MG/DL (ref 0.57–2.63)
PROT SERPL-MCNC: 7.5 G/DL (ref 6–8.4)

## 2022-03-15 LAB — PATHOLOGIST INTERPRETATION SPE: NORMAL

## 2022-03-16 ENCOUNTER — TELEPHONE (OUTPATIENT)
Dept: SMOKING CESSATION | Facility: CLINIC | Age: 62
End: 2022-03-16
Payer: MEDICARE

## 2022-03-21 ENCOUNTER — TELEPHONE (OUTPATIENT)
Dept: SMOKING CESSATION | Facility: CLINIC | Age: 62
End: 2022-03-21
Payer: MEDICARE

## 2022-03-21 NOTE — TELEPHONE ENCOUNTER
Attempted to contact patient in regard to missed clinic intake appointment. Voicemail box is not set up yet. Unable to leave a message.

## 2022-03-23 ENCOUNTER — TELEPHONE (OUTPATIENT)
Dept: RADIOLOGY | Facility: HOSPITAL | Age: 62
End: 2022-03-23
Payer: MEDICARE

## 2022-03-23 DIAGNOSIS — D68.9 COAGULATION DEFECT, UNSPECIFIED: Primary | ICD-10-CM

## 2022-03-23 NOTE — TELEPHONE ENCOUNTER
Interventional Radiology:    Tried calling pt for pre-procedure instructions but no answer and no voicemail set up.

## 2022-03-24 ENCOUNTER — HOSPITAL ENCOUNTER (OUTPATIENT)
Dept: RADIOLOGY | Facility: HOSPITAL | Age: 62
Discharge: HOME | End: 2022-03-24
Attending: INTERNAL MEDICINE
Payer: MEDICARE

## 2022-03-24 ENCOUNTER — TELEPHONE (OUTPATIENT)
Dept: SMOKING CESSATION | Facility: CLINIC | Age: 62
End: 2022-03-24
Payer: MEDICARE

## 2022-03-24 DIAGNOSIS — C34.90 MALIGNANT NEOPLASM OF UNSPECIFIED PART OF UNSPECIFIED BRONCHUS OR LUNG: ICD-10-CM

## 2022-03-24 NOTE — PROGRESS NOTES
Dr. Walters reviewed the images and discussed with Dr. Avendano that it appears to be degenerative changes in the patient's bony network and it is best to not perform a biopsy at this point.      The patient was informed of this and agreed.

## 2022-03-25 ENCOUNTER — TELEPHONE (OUTPATIENT)
Dept: HEMATOLOGY/ONCOLOGY | Facility: CLINIC | Age: 62
End: 2022-03-25
Payer: MEDICARE

## 2022-03-25 NOTE — TELEPHONE ENCOUNTER
Spoke to pt informed him that the biopsy was cancelled because it was never approved by Dr. Walters for a biopsy and after he reviewed the images, he determined that there was nothing to biopsy. Dr. Walters also spoke with Dr. Avendano yesterday as well and they determined that the pt will have follow up imaging in 3 months. If you have any further questions, please let me know.Pt states that he is having a lot of pain. Informed him that he should discuss that with Dr Avendano at the upcoming appt on 3/31. Pt verbalized understanding.

## 2022-03-25 NOTE — TELEPHONE ENCOUNTER
----- Message from Allan Toussaint sent at 3/25/2022  8:05 AM CDT -----  Contact: self  Pt would like to consult with nurse regarding recent cancellation of a biopsy and other concerns.  Please contact Beltran Cruz @ [phone].  Thanks/As

## 2022-03-28 ENCOUNTER — TELEPHONE (OUTPATIENT)
Dept: HEMATOLOGY/ONCOLOGY | Facility: CLINIC | Age: 62
End: 2022-03-28
Payer: MEDICARE

## 2022-03-28 NOTE — TELEPHONE ENCOUNTER
Nurse spoke with pt in regards to rescheduling for a virtual visit. Pt has been rescheduled. Verbalized understanding.

## 2022-03-28 NOTE — TELEPHONE ENCOUNTER
----- Message from Gabrielle Mistry sent at 3/28/2022  3:12 PM CDT -----  Contact: [phone] Patient  Pt states he is having transportation issues and is requesting to r/s his 03/31 appt as a virtual. Please call and advise.

## 2022-04-06 ENCOUNTER — TELEPHONE (OUTPATIENT)
Dept: SMOKING CESSATION | Facility: CLINIC | Age: 62
End: 2022-04-06
Payer: MEDICARE

## 2022-04-06 NOTE — TELEPHONE ENCOUNTER
Attempted to contact patient in regard to missed clinic intake appointment. Voicemail box is not set up. Unable to leave a message.

## 2022-05-02 ENCOUNTER — TELEPHONE (OUTPATIENT)
Dept: PAIN MEDICINE | Facility: CLINIC | Age: 62
End: 2022-05-02
Payer: MEDICARE

## 2022-05-16 ENCOUNTER — OFFICE VISIT (OUTPATIENT)
Dept: HEMATOLOGY/ONCOLOGY | Facility: CLINIC | Age: 62
End: 2022-05-16
Payer: MEDICARE

## 2022-05-16 VITALS
TEMPERATURE: 98 F | SYSTOLIC BLOOD PRESSURE: 122 MMHG | HEIGHT: 70 IN | OXYGEN SATURATION: 96 % | DIASTOLIC BLOOD PRESSURE: 79 MMHG | HEART RATE: 70 BPM | WEIGHT: 152.75 LBS | BODY MASS INDEX: 21.87 KG/M2

## 2022-05-16 DIAGNOSIS — M54.9 DORSALGIA, UNSPECIFIED: ICD-10-CM

## 2022-05-16 DIAGNOSIS — M54.6 PAIN IN THORACIC SPINE: ICD-10-CM

## 2022-05-16 DIAGNOSIS — F11.20 UNCOMPLICATED OPIOID DEPENDENCE: ICD-10-CM

## 2022-05-16 DIAGNOSIS — F19.10 SUBSTANCE ABUSE: Chronic | ICD-10-CM

## 2022-05-16 DIAGNOSIS — G89.4 CHRONIC PAIN SYNDROME: Chronic | ICD-10-CM

## 2022-05-16 DIAGNOSIS — Z72.0 TOBACCO ABUSE: Chronic | ICD-10-CM

## 2022-05-16 DIAGNOSIS — C18.4 MALIGNANT NEOPLASM OF TRANSVERSE COLON: Primary | Chronic | ICD-10-CM

## 2022-05-16 DIAGNOSIS — J44.9 CHRONIC OBSTRUCTIVE PULMONARY DISEASE, UNSPECIFIED COPD TYPE: Chronic | ICD-10-CM

## 2022-05-16 PROCEDURE — 1159F MED LIST DOCD IN RCRD: CPT | Mod: CPTII,S$GLB,, | Performed by: INTERNAL MEDICINE

## 2022-05-16 PROCEDURE — 3008F PR BODY MASS INDEX (BMI) DOCUMENTED: ICD-10-PCS | Mod: CPTII,S$GLB,, | Performed by: INTERNAL MEDICINE

## 2022-05-16 PROCEDURE — 99214 OFFICE O/P EST MOD 30 MIN: CPT | Mod: S$GLB,,, | Performed by: INTERNAL MEDICINE

## 2022-05-16 PROCEDURE — 99999 PR PBB SHADOW E&M-EST. PATIENT-LVL IV: ICD-10-PCS | Mod: PBBFAC,,, | Performed by: INTERNAL MEDICINE

## 2022-05-16 PROCEDURE — 3078F DIAST BP <80 MM HG: CPT | Mod: CPTII,S$GLB,, | Performed by: INTERNAL MEDICINE

## 2022-05-16 PROCEDURE — 3074F SYST BP LT 130 MM HG: CPT | Mod: CPTII,S$GLB,, | Performed by: INTERNAL MEDICINE

## 2022-05-16 PROCEDURE — 1159F PR MEDICATION LIST DOCUMENTED IN MEDICAL RECORD: ICD-10-PCS | Mod: CPTII,S$GLB,, | Performed by: INTERNAL MEDICINE

## 2022-05-16 PROCEDURE — 1160F RVW MEDS BY RX/DR IN RCRD: CPT | Mod: CPTII,S$GLB,, | Performed by: INTERNAL MEDICINE

## 2022-05-16 PROCEDURE — 99214 PR OFFICE/OUTPT VISIT, EST, LEVL IV, 30-39 MIN: ICD-10-PCS | Mod: S$GLB,,, | Performed by: INTERNAL MEDICINE

## 2022-05-16 PROCEDURE — 1160F PR REVIEW ALL MEDS BY PRESCRIBER/CLIN PHARMACIST DOCUMENTED: ICD-10-PCS | Mod: CPTII,S$GLB,, | Performed by: INTERNAL MEDICINE

## 2022-05-16 PROCEDURE — 3008F BODY MASS INDEX DOCD: CPT | Mod: CPTII,S$GLB,, | Performed by: INTERNAL MEDICINE

## 2022-05-16 PROCEDURE — 3074F PR MOST RECENT SYSTOLIC BLOOD PRESSURE < 130 MM HG: ICD-10-PCS | Mod: CPTII,S$GLB,, | Performed by: INTERNAL MEDICINE

## 2022-05-16 PROCEDURE — 3078F PR MOST RECENT DIASTOLIC BLOOD PRESSURE < 80 MM HG: ICD-10-PCS | Mod: CPTII,S$GLB,, | Performed by: INTERNAL MEDICINE

## 2022-05-16 PROCEDURE — 99999 PR PBB SHADOW E&M-EST. PATIENT-LVL IV: CPT | Mod: PBBFAC,,, | Performed by: INTERNAL MEDICINE

## 2022-05-16 NOTE — PROGRESS NOTES
Subjective:       Patient ID: Beltran Cruz is a 61 y.o. male.    Chief Complaint: Results and Pain    HPI 61-year-old male previous diagnosis stage I colon carcinoma.  Patient continues to actively smoke was last seen with repeat imaging studies demonstrating abnormal finding at T12.  Orient that there was nothing of abnormality to suggest biopsy.  Discussed with Interventional Radiology patient is seen for continued follow-up accompanied by family member    Past Medical History:   Diagnosis Date    Back pain     Cancer     Colon    Chronic hepatitis C     Chronic obstructive pulmonary disease 8/9/2016    Colon cancer 10/2016    T1 N0    Colon polyps     Colonoscopy 9/6/2016    Diverticulosis     Colonoscopy 9/6/2016    Drug overdose, intentional 03/22/2008    benzo,opiates, alcohol, asa , acetomenopjen    Eye injury     right eye    Hemorrhoids     Colonoscopy 9/6/2016    Screening PSA (prostate specific antigen) 8/24/2016     Family History   Problem Relation Age of Onset    Retinal detachment Brother     Hypertension Brother      Social History     Socioeconomic History    Marital status:    Tobacco Use    Smoking status: Current Every Day Smoker     Packs/day: 1.00     Years: 42.00     Pack years: 42.00     Types: Cigarettes    Smokeless tobacco: Never Used    Tobacco comment: no smoking after 12 midnight prior to surgery   Substance and Sexual Activity    Alcohol use: Not Currently     Comment: social    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     Past Surgical History:   Procedure Laterality Date    ANKLE SURGERY      APPENDECTOMY      BACK SURGERY      COLONOSCOPY N/A 9/6/2016    Procedure: COLONOSCOPY;  Surgeon: Jimbo Farrell MD;  Location: [facility];  Service: Endoscopy;  Laterality: N/A;    COLONOSCOPY N/A 7/17/2017    Procedure: COLONOSCOPY;  Surgeon: Jimbo Farrell MD;  Location: [facility];  Service: Endoscopy;  Laterality: N/A;    HEMICOLECTOMY  10/4/  2016    laprascopic for colon ca    SHOULDER SURGERY         Labs:  Lab Results   Component Value Date    WBC 4.90 03/11/2022    HGB 12.8 (L) 03/11/2022    HCT 41.1 03/11/2022    MCV 93 03/11/2022     03/11/2022     BMP  Lab Results   Component Value Date     03/11/2022    K 4.2 03/11/2022     03/11/2022    CO2 25 03/11/2022    BUN 21 03/11/2022    CREATININE 0.9 03/11/2022    CALCIUM 9.0 03/11/2022    ANIONGAP 8 03/11/2022    ESTGFRAFRICA >60 03/11/2022    EGFRNONAA >60 03/11/2022     Lab Results   Component Value Date     (H) 03/11/2022    AST 92 (H) 03/11/2022    GGT 39 08/31/2016    ALKPHOS 88 03/11/2022    BILITOT 0.4 03/11/2022       Lab Results   Component Value Date    IRON 56 03/11/2022    TIBC 419 03/11/2022    FERRITIN 162 03/11/2022     No results found for: [id number]  No results found for: FOLATE  No results found for: TSH      Review of Systems   Constitutional: Positive for fatigue. Negative for activity change, appetite change, chills, diaphoresis, fever and unexpected weight change.   HENT: Negative for congestion, dental problem, drooling, ear discharge, ear pain, facial swelling, hearing loss, mouth sores, nosebleeds, postnasal drip, rhinorrhea, sinus pressure, sneezing, sore throat, tinnitus, trouble swallowing and voice change.    Eyes: Negative for photophobia, pain, discharge, redness, itching and visual disturbance.   Respiratory: Negative for apnea, cough, choking, chest tightness, shortness of breath, wheezing and stridor.    Cardiovascular: Negative for chest pain, palpitations and leg swelling.   Gastrointestinal: Negative for abdominal distention, abdominal pain, anal bleeding, blood in stool, constipation, diarrhea, nausea, rectal pain and vomiting.   Endocrine: Negative for cold intolerance, heat intolerance, polydipsia, polyphagia and polyuria.   Genitourinary: Negative for decreased urine volume, difficulty urinating, dysuria, enuresis, flank pain,  frequency, genital sores, hematuria, penile discharge, penile pain, penile swelling, scrotal swelling, testicular pain and urgency.   Musculoskeletal: Positive for back pain. Negative for arthralgias, gait problem, joint swelling, myalgias, neck pain and neck stiffness.   Skin: Negative for color change, pallor, rash and wound.   Allergic/Immunologic: Negative for environmental allergies, food allergies and immunocompromised state.   Neurological: Positive for weakness. Negative for dizziness, tremors, seizures, syncope, facial asymmetry, speech difficulty, light-headedness, numbness and headaches.   Hematological: Negative for adenopathy. Does not bruise/bleed easily.   Psychiatric/Behavioral: Positive for dysphoric mood. Negative for agitation, behavioral problems, confusion, decreased concentration, hallucinations, self-injury, sleep disturbance and suicidal ideas. The patient is nervous/anxious. The patient is not hyperactive.        Objective:      Physical Exam  Vitals reviewed.   Constitutional:       General: He is not in acute distress.     Appearance: He is well-developed. He is not diaphoretic.   HENT:      Head: Normocephalic.      Right Ear: External ear normal.      Left Ear: External ear normal.      Nose: Nose normal.      Right Sinus: No maxillary sinus tenderness or frontal sinus tenderness.      Left Sinus: No maxillary sinus tenderness or frontal sinus tenderness.      Mouth/Throat:      Pharynx: No oropharyngeal exudate.   Eyes:      General: Lids are normal. No scleral icterus.        Right eye: No discharge.         Left eye: No discharge.      Extraocular Movements:      Right eye: Normal extraocular motion.      Left eye: Normal extraocular motion.      Conjunctiva/sclera:      Right eye: Right conjunctiva is not injected. No hemorrhage.     Left eye: Left conjunctiva is not injected. No hemorrhage.     Pupils: Pupils are equal, round, and reactive to light.   Neck:      Thyroid: No  thyromegaly.      Vascular: No JVD.      Trachea: No tracheal deviation.   Cardiovascular:      Rate and Rhythm: Normal rate.   Pulmonary:      Effort: Pulmonary effort is normal. No respiratory distress.      Breath sounds: No stridor.   Chest:   Breasts:      Right: No supraclavicular adenopathy.      Left: No supraclavicular adenopathy.       Abdominal:      General: Bowel sounds are normal.      Palpations: Abdomen is soft. There is no hepatomegaly, splenomegaly or mass.      Tenderness: There is no abdominal tenderness.   Musculoskeletal:         General: No tenderness. Normal range of motion.      Cervical back: Normal range of motion and neck supple.   Lymphadenopathy:      Head:      Right side of head: No posterior auricular or occipital adenopathy.      Left side of head: No posterior auricular or occipital adenopathy.      Cervical: No cervical adenopathy.      Right cervical: No superficial, deep or posterior cervical adenopathy.     Left cervical: No superficial, deep or posterior cervical adenopathy.      Upper Body:      Right upper body: No supraclavicular adenopathy.      Left upper body: No supraclavicular adenopathy.   Skin:     General: Skin is dry.      Findings: No erythema or rash.      Nails: There is no clubbing.   Neurological:      Mental Status: He is alert and oriented to person, place, and time.      Cranial Nerves: No cranial nerve deficit.      Coordination: Coordination normal.   Psychiatric:         Behavior: Behavior normal.         Thought Content: Thought content normal.         Judgment: Judgment normal.             Assessment:      1. Malignant neoplasm of transverse colon    2. Pain in thoracic spine    3. Dorsalgia, unspecified    4. Chronic obstructive pulmonary disease, unspecified COPD type    5. Uncomplicated opioid dependence    6. Chronic pain syndrome    7. Substance abuse    8. Tobacco abuse           Plan:       Previous history of early stage colon carcinoma.  At  this time no clear evidence of malignancy reviewed imaging with them.  At this point I believe that he had an MRI done in February of 2022 in the [facility] system.  Ask that this be placed in the disc will be seen back over the next month with repeat MRI of this area see whether not this been the change.  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  Discussed implications and answered questions family total time 25 minutes      Jorge Avendano Jr, MD FACP

## 2022-05-17 NOTE — TELEPHONE ENCOUNTER
----- Message from Tracy Treviño sent at 1/22/2018  4:01 PM CST -----  Pt is requesting a call from nurse to discuss some health concerns.        Please call pt back at [phone]   abnormal lab result

## 2022-06-01 ENCOUNTER — TELEPHONE (OUTPATIENT)
Dept: SMOKING CESSATION | Facility: CLINIC | Age: 62
End: 2022-06-01
Payer: MEDICARE

## 2022-06-22 ENCOUNTER — TELEPHONE (OUTPATIENT)
Dept: SMOKING CESSATION | Facility: CLINIC | Age: 62
End: 2022-06-22
Payer: MEDICARE

## 2022-06-22 NOTE — TELEPHONE ENCOUNTER
Attempted to contact patient in regard to rescheduling missed clinic intake appointment. Voicemail box has not been set up yet. Unable to leave a message.

## 2022-07-18 ENCOUNTER — TELEPHONE (OUTPATIENT)
Dept: PAIN MEDICINE | Facility: CLINIC | Age: 62
End: 2022-07-18
Payer: MEDICARE

## 2022-07-31 DIAGNOSIS — M54.6 PAIN IN THORACIC SPINE: ICD-10-CM

## 2022-07-31 DIAGNOSIS — M54.9 DORSALGIA, UNSPECIFIED: Primary | ICD-10-CM

## 2022-09-08 ENCOUNTER — TELEPHONE (OUTPATIENT)
Dept: PAIN MEDICINE | Facility: CLINIC | Age: 62
End: 2022-09-08
Payer: MEDICARE

## 2022-09-26 ENCOUNTER — TELEPHONE (OUTPATIENT)
Dept: HEMATOLOGY/ONCOLOGY | Facility: CLINIC | Age: 62
End: 2022-09-26
Payer: MEDICARE

## 2022-09-26 NOTE — TELEPHONE ENCOUNTER
Spoke with Lynda about the forms sent over. Patient is requesting genetic testing with their lab. They will need paper work filled out before the testing kit can be sent out to the home.

## 2022-09-26 NOTE — TELEPHONE ENCOUNTER
----- Message from Jenna Chen LPN sent at 9/23/2022  3:35 PM CDT -----  Contact: Dylan/Michael singletary    ----- Message -----  From: Isaac Cash  Sent: 9/23/2022   3:31 PM CDT  To: [facility] Staff    Dylan would like a call back at [phone] ext [phone], fax at [phone] in regards to the form that was faxed over to the office for genetic testing. They need a signed form saying patient has a history of cancer.  Thanks

## 2022-09-29 DIAGNOSIS — C18.4 MALIGNANT NEOPLASM OF TRANSVERSE COLON: Primary | ICD-10-CM

## 2022-09-30 ENCOUNTER — TELEPHONE (OUTPATIENT)
Dept: GENETICS | Facility: CLINIC | Age: 62
End: 2022-09-30
Payer: MEDICARE

## 2022-10-24 ENCOUNTER — HOSPITAL ENCOUNTER (EMERGENCY)
Facility: HOSPITAL | Age: 62
Discharge: HOME | End: 2022-10-24
Attending: EMERGENCY MEDICINE
Payer: COMMERCIAL

## 2022-10-24 VITALS
DIASTOLIC BLOOD PRESSURE: 73 MMHG | SYSTOLIC BLOOD PRESSURE: 120 MMHG | HEART RATE: 60 BPM | OXYGEN SATURATION: 98 % | RESPIRATION RATE: 18 BRPM | TEMPERATURE: 98 F | HEIGHT: 70 IN | WEIGHT: 154.56 LBS | BODY MASS INDEX: 22.13 KG/M2

## 2022-10-24 DIAGNOSIS — M54.6 THORACIC BACK PAIN: ICD-10-CM

## 2022-10-24 DIAGNOSIS — R10.13 EPIGASTRIC PAIN: Primary | ICD-10-CM

## 2022-10-24 LAB
ALBUMIN SERPL BCP-MCNC: 3.3 G/DL (ref 3.5–5.2)
ALP SERPL-CCNC: 83 U/L (ref 55–135)
ALT SERPL W/O P-5'-P-CCNC: 251 U/L (ref 10–44)
ANION GAP SERPL CALC-SCNC: 6 MMOL/L (ref 8–16)
AST SERPL-CCNC: 160 U/L (ref 10–40)
BASOPHILS # BLD AUTO: 0.03 K/UL (ref 0–0.2)
BASOPHILS NFR BLD: 0.6 % (ref 0–1.9)
BILIRUB SERPL-MCNC: 0.5 MG/DL (ref 0.1–1)
BUN SERPL-MCNC: 18 MG/DL (ref 8–23)
CALCIUM SERPL-MCNC: 8.3 MG/DL (ref 8.7–10.5)
CHLORIDE SERPL-SCNC: 108 MMOL/L (ref 95–110)
CO2 SERPL-SCNC: 26 MMOL/L (ref 23–29)
CREAT SERPL-MCNC: 0.7 MG/DL (ref 0.5–1.4)
DIFFERENTIAL METHOD: ABNORMAL
EOSINOPHIL # BLD AUTO: 0.3 K/UL (ref 0–0.5)
EOSINOPHIL NFR BLD: 4.8 % (ref 0–8)
ERYTHROCYTE [DISTWIDTH] IN BLOOD BY AUTOMATED COUNT: 13.8 % (ref 11.5–14.5)
EST. GFR  (NO RACE VARIABLE): >60 ML/MIN/1.73 M^2
GLUCOSE SERPL-MCNC: 77 MG/DL (ref 70–110)
HCT VFR BLD AUTO: 36 % (ref 40–54)
HGB BLD-MCNC: 11.9 G/DL (ref 14–18)
IMM GRANULOCYTES # BLD AUTO: 0.02 K/UL (ref 0–0.04)
IMM GRANULOCYTES NFR BLD AUTO: 0.4 % (ref 0–0.5)
LIPASE SERPL-CCNC: 24 U/L (ref 4–60)
LYMPHOCYTES # BLD AUTO: 1.9 K/UL (ref 1–4.8)
LYMPHOCYTES NFR BLD: 35.6 % (ref 18–48)
MCH RBC QN AUTO: 29.2 PG (ref 27–31)
MCHC RBC AUTO-ENTMCNC: 33.1 G/DL (ref 32–36)
MCV RBC AUTO: 88 FL (ref 82–98)
MONOCYTES # BLD AUTO: 0.7 K/UL (ref 0.3–1)
MONOCYTES NFR BLD: 12.4 % (ref 4–15)
NEUTROPHILS # BLD AUTO: 2.4 K/UL (ref 1.8–7.7)
NEUTROPHILS NFR BLD: 46.2 % (ref 38–73)
NRBC BLD-RTO: 0 /100 WBC
PLATELET # BLD AUTO: 256 K/UL (ref 150–450)
PMV BLD AUTO: 9.6 FL (ref 9.2–12.9)
POTASSIUM SERPL-SCNC: 3.6 MMOL/L (ref 3.5–5.1)
PROT SERPL-MCNC: 6.9 G/DL (ref 6–8.4)
RBC # BLD AUTO: 4.08 M/UL (ref 4.6–6.2)
SODIUM SERPL-SCNC: 140 MMOL/L (ref 136–145)
WBC # BLD AUTO: 5.25 K/UL (ref 3.9–12.7)

## 2022-10-24 PROCEDURE — 85025 COMPLETE CBC W/AUTO DIFF WBC: CPT | Performed by: NURSE PRACTITIONER

## 2022-10-24 PROCEDURE — 80053 COMPREHEN METABOLIC PANEL: CPT | Performed by: NURSE PRACTITIONER

## 2022-10-24 PROCEDURE — 83690 ASSAY OF LIPASE: CPT | Performed by: NURSE PRACTITIONER

## 2022-10-24 PROCEDURE — 99285 EMERGENCY DEPT VISIT HI MDM: CPT | Mod: 25

## 2022-10-24 PROCEDURE — 96374 THER/PROPH/DIAG INJ IV PUSH: CPT

## 2022-10-24 PROCEDURE — 93005 ELECTROCARDIOGRAM TRACING: CPT

## 2022-10-24 PROCEDURE — 93010 ELECTROCARDIOGRAM REPORT: CPT | Mod: ,,, | Performed by: INTERNAL MEDICINE

## 2022-10-24 PROCEDURE — 63600175 PHARM REV CODE 636 W HCPCS: Performed by: NURSE PRACTITIONER

## 2022-10-24 PROCEDURE — 93010 EKG 12-LEAD: ICD-10-PCS | Mod: ,,, | Performed by: INTERNAL MEDICINE

## 2022-10-24 RX ORDER — ONDANSETRON 2 MG/ML
4 INJECTION INTRAMUSCULAR; INTRAVENOUS
Status: COMPLETED | OUTPATIENT
Start: 2022-10-24 | End: 2022-10-24

## 2022-10-24 RX ADMIN — ONDANSETRON 4 MG: 2 INJECTION INTRAMUSCULAR; INTRAVENOUS at 02:10

## 2022-10-24 NOTE — ED PROVIDER NOTES
"SCRIBE #1 NOTE: I, Esperanza Jhon, am scribing for, and in the presence of, Padmini Forrest MD. I have scribed the entire note.      History      Chief Complaint   Patient presents with    Abdominal Pain     Abdominal pain, reports a hard area in middle upper stomach. Hx of colon cancer.+ intermittent nausea and diarrhea.       Review of patient's allergies indicates:   Allergen Reactions    Penicillins Shortness Of Breath     Throat swelling         HPI   HPI    10/24/2022, 3:14 PM   History obtained from the patient      History of Present Illness: Beltran Cruz is a 62 y.o. male patient with a PMHx of back pain, colon cancer s/p hemicolectomy in 2016, chronic hepatitis C, and COPD who presents to the Emergency Department for epigastric abdominal pain which onset gradually and is worsening. The pt reports that he was evaluated for back pain at an [facility] ER a couple of months ago and was told that he could have bone cancer after a CT scan was performed, but the pt reports that he never followed up after the ER visit because he did not want to know if he had cancer. He states that he is not undergoing chemotherapy or radiation at this time. Symptoms are constant and moderate in severity. No mitigating or exacerbating factors reported. Associated sxs include a "hard" area to the epigastric region of the abdomen, nausea, and lower thoracic/lumbar back pain. Patient denies any fever, chills, V/D, constipation, difficulty urinating, and all other sxs at this time. Pt reports that he has had back surgery in the past performed by Dr. Jose. No further complaints or concerns at this time.         Arrival mode: Personal vehicle    PCP: Parvez Lopez MD       Past Medical History:  Past Medical History:   Diagnosis Date    Back pain     Cancer     Colon    Chronic hepatitis C     Chronic obstructive pulmonary disease 8/9/2016    Colon cancer 10/2016    T1 N0    Colon polyps     Colonoscopy 9/6/2016    Diverticulosis     " "Colonoscopy 9/6/2016    Drug overdose, intentional 03/22/2008    benzo,opiates, alcohol, asa , acetomenopjen    Eye injury     right eye    Hemorrhoids     Colonoscopy 9/6/2016    Screening PSA (prostate specific antigen) 8/24/2016       Past Surgical History:  Past Surgical History:   Procedure Laterality Date    ANKLE SURGERY      APPENDECTOMY      BACK SURGERY      COLONOSCOPY N/A 9/6/2016    Procedure: COLONOSCOPY;  Surgeon: Jimbo Farrell MD;  Location: [facility] ENDO;  Service: Endoscopy;  Laterality: N/A;    COLONOSCOPY N/A 7/17/2017    Procedure: COLONOSCOPY;  Surgeon: Jimbo Farrell MD;  Location: [facility] ENDO;  Service: Endoscopy;  Laterality: N/A;    HEMICOLECTOMY  10/4/ 2016    laprascopic for colon ca    SHOULDER SURGERY           Family History:  Family History   Problem Relation Age of Onset    Retinal detachment Brother     Hypertension Brother        Social History:  Social History     Tobacco Use    Smoking status: Every Day     Packs/day: 1.00     Years: 42.00     Pack years: 42.00     Types: Cigarettes    Smokeless tobacco: Never    Tobacco comments:     no smoking after 12 midnight prior to surgery   Substance and Sexual Activity    Alcohol use: Not Currently     Comment: social    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    Sexual activity: Not on file       ROS   Review of Systems   Constitutional:  Negative for chills and fever.   HENT:  Negative for sore throat.    Respiratory:  Negative for shortness of breath.    Cardiovascular:  Negative for chest pain.   Gastrointestinal:  Positive for abdominal pain (epigastric) and nausea. Negative for constipation, diarrhea and vomiting.        (+) "hard" area to the epigastric region of the abdomen   Genitourinary:  Negative for difficulty urinating and dysuria.   Musculoskeletal:  Positive for back pain (lower thoracic/lumbar).   Skin:  Negative for rash.   Neurological:  Negative for weakness.   Hematological:  Does not bruise/bleed " "easily.   All other systems reviewed and are negative.    Physical Exam      Initial Vitals [10/24/22 1428]   BP Pulse Resp Temp SpO2   126/78 70 20 97.8 °F (36.6 °C) 98 %      MAP       --          Physical Exam  Nursing Notes and Vital Signs Reviewed.  Constitutional: Patient is in no acute distress. Well-developed and well-nourished.  Head: Atraumatic. Normocephalic.  Eyes: PERRL. EOM intact. Conjunctivae are not pale. No scleral icterus.  ENT: Mucous membranes are moist. Oropharynx is clear and symmetric.    Neck: Supple. Full ROM. No lymphadenopathy.  Cardiovascular: Regular rate. Regular rhythm. No murmurs, rubs, or gallops. Distal pulses are 2+ and symmetric.  Pulmonary/Chest: No respiratory distress. Clear to auscultation bilaterally. No wheezing or rales.  Abdominal: Soft and non-distended.  There is no tenderness.  No rebound, guarding, or rigidity.  Musculoskeletal: Moves all extremities. No obvious deformities. No edema.   Back: There are old surgical scars noted to the lower thoracic and lumbar spine region as well as tenderness to palpation.  Skin: Warm and dry.  Neurological:  Alert, awake, and appropriate.  Normal speech.  No acute focal neurological deficits are appreciated.  Psychiatric: Normal affect. Good eye contact. Appropriate in content.    ED Course    Procedures  ED Vital Signs:  Vitals:    10/24/22 1428 10/24/22 1515 10/24/22 1530 10/24/22 1628   BP: 126/78 123/74  139/81   Pulse: 70 66 64 (!) 59   Resp: 20 16  16   Temp: 97.8 °F (36.6 °C)   98.1 °F (36.7 °C)   TempSrc: Oral   Oral   SpO2: 98% 99%  100%   Weight: 70.1 kg (154 lb 8.7 oz)      Height: 5' 10" (1.778 m)       10/24/22 1719   BP: 120/73   Pulse: 60   Resp: 18   Temp: 98 °F (36.7 °C)   TempSrc: Oral   SpO2: 98%   Weight:    Height:        Abnormal Lab Results:  Labs Reviewed   CBC W/ AUTO DIFFERENTIAL - Abnormal; Notable for the following components:       Result Value    RBC 4.08 (*)     Hemoglobin 11.9 (*)     Hematocrit 36.0 " (*)     All other components within normal limits   COMPREHENSIVE METABOLIC PANEL - Abnormal; Notable for the following components:    Calcium 8.3 (*)     Albumin 3.3 (*)      (*)      (*)     Anion Gap 6 (*)     All other components within normal limits   LIPASE   URINALYSIS, REFLEX TO URINE CULTURE        All Lab Results:  Results for orders placed or performed during the hospital encounter of 10/24/22   CBC auto differential   Result Value Ref Range    WBC 5.25 3.90 - 12.70 K/uL    RBC 4.08 (L) 4.60 - 6.20 M/uL    Hemoglobin 11.9 (L) 14.0 - 18.0 g/dL    Hematocrit 36.0 (L) 40.0 - 54.0 %    MCV 88 82 - 98 fL    MCH 29.2 27.0 - 31.0 pg    MCHC 33.1 32.0 - 36.0 g/dL    RDW 13.8 11.5 - 14.5 %    Platelets 256 150 - 450 K/uL    MPV 9.6 9.2 - 12.9 fL    Immature Granulocytes 0.4 0.0 - 0.5 %    Gran # (ANC) 2.4 1.8 - 7.7 K/uL    Immature Grans (Abs) 0.02 0.00 - 0.04 K/uL    Lymph # 1.9 1.0 - 4.8 K/uL    Mono # 0.7 0.3 - 1.0 K/uL    Eos # 0.3 0.0 - 0.5 K/uL    Baso # 0.03 0.00 - 0.20 K/uL    nRBC 0 0 /100 WBC    Gran % 46.2 38.0 - 73.0 %    Lymph % 35.6 18.0 - 48.0 %    Mono % 12.4 4.0 - 15.0 %    Eosinophil % 4.8 0.0 - 8.0 %    Basophil % 0.6 0.0 - 1.9 %    Differential Method Automated    Comprehensive metabolic panel   Result Value Ref Range    Sodium 140 136 - 145 mmol/L    Potassium 3.6 3.5 - 5.1 mmol/L    Chloride 108 95 - 110 mmol/L    CO2 26 23 - 29 mmol/L    Glucose 77 70 - 110 mg/dL    BUN 18 8 - 23 mg/dL    Creatinine 0.7 0.5 - 1.4 mg/dL    Calcium 8.3 (L) 8.7 - 10.5 mg/dL    Total Protein 6.9 6.0 - 8.4 g/dL    Albumin 3.3 (L) 3.5 - 5.2 g/dL    Total Bilirubin 0.5 0.1 - 1.0 mg/dL    Alkaline Phosphatase 83 55 - 135 U/L     (H) 10 - 40 U/L     (H) 10 - 44 U/L    Anion Gap 6 (L) 8 - 16 mmol/L    eGFR >60 >60 mL/min/1.73 m^2   Lipase   Result Value Ref Range    Lipase 24 4 - 60 U/L         Imaging Results:  Imaging Results              X-Ray Thoracic Spine AP Lateral (Final result)   Result time 10/24/22 15:32:45      Final result by Alli Aguila MD (10/24/22 15:32:45)                   Impression:      No acute abnormality.      Electronically signed by: Alli Aguila  Date:    10/24/2022  Time:    15:32               Narrative:    EXAMINATION:  XR THORACIC SPINE AP LATERAL    CLINICAL HISTORY:  Pain in thoracic spine    TECHNIQUE:  AP and lateral views of the thoracic spine were performed.    COMPARISON:  None    FINDINGS:  Bones appear osteopenic.  No fracture or dislocation.  Vertebral body height is normal.  Moderate discogenic degenerative change L1-2.  Incompletely visualized lungs and soft tissues unremarkable.  Alignment is maintained.                                       X-Ray Abdomen Flat And Erect (Final result)  Result time 10/24/22 15:35:45      Final result by JS Owens Sr., MD (10/24/22 15:35:45)                   Impression:      1. There is a subtle amount of haziness in the lateral aspect of the base of the left lung.  This is characteristic of atelectasis or subtle pneumonia.  2. The bowel gas pattern is normal in appearance.  3. There is posterior spinal fusion hardware in the lumbar spine.      Electronically signed by: Gigi Owens MD  Date:    10/24/2022  Time:    15:35               Narrative:    EXAMINATION:  XR ABDOMEN FLAT AND ERECT    CLINICAL HISTORY:  Epigastric pain    COMPARISON:  None    FINDINGS:  The bowel gas pattern is normal in appearance. There is no pneumoperitoneum.  There is posterior spinal fusion hardware in the lumbar spine.  There is a subtle amount of haziness in the lateral aspect of the base of the left lung.                                     The EKG was ordered, reviewed, and independently interpreted by the ED provider.  Interpretation time: 15:48  Rate: 65 BPM  Rhythm: normal sinus rhythm  Interpretation: Left axis deviation. No STEMI.           The Emergency Provider reviewed the vital signs and test results, which are outlined  above.    ED Discussion     5:09 PM: Reassessed pt at this time. Discussed with pt all pertinent ED information and results. Discussed pt dx and plan of tx. Gave pt all f/u and return to the ED instructions. All questions and concerns were addressed at this time. Pt expresses understanding of information and instructions, and is comfortable with plan to discharge. Pt is stable for discharge.    I discussed with patient and/or family/caretaker that evaluation in the ED does not suggest any emergent or life threatening medical conditions requiring immediate intervention beyond what was provided in the ED, and I believe patient is safe for discharge.  Regardless, an unremarkable evaluation in the ED does not preclude the development or presence of a serious of life threatening condition. As such, patient was instructed to return immediately for any worsening or change in current symptoms.             ED Medication(s):  Medications   ondansetron injection 4 mg (4 mg Intravenous Given 10/24/22 2101)     New Prescriptions    No medications on file        Follow-up Information       [facility]. Schedule an appointment as soon as possible for a visit in 2 days.    Specialty: Gastroenterology  Contact information:  [address] [place] [id number] [phone]                             Medical Decision Making    Medical Decision Making:   Clinical Tests:   Lab Tests: Ordered and Reviewed  Radiological Study: Ordered and Reviewed  Medical Tests: Ordered and Reviewed         Scribe Attestation:   Scribe #1: I performed the above scribed service and the documentation accurately describes the services I performed. I attest to the accuracy of the note.    Attending:   Physician Attestation Statement for Scribe #1: I, Padmini Forrest MD, personally performed the services described in this documentation, as scribed by Esperanza Ferreira, in my presence, and it is both accurate and complete.           Clinical Impression       ICD-10-CM ICD-9-CM   1. Epigastric pain  R10.13 789.06   2. Thoracic back pain  M54.6 724.1       Disposition:   Disposition: Discharged  Condition: Stable       Padmini Forrest MD  10/24/22 9142

## 2022-10-24 NOTE — FIRST PROVIDER EVALUATION
"Medical screening examination initiated.  I have conducted a focused provider triage encounter, findings are as follows:    Brief history of present illness:  Patient presents to the emergency department for abdominal pain     Vitals:    10/24/22 1428   BP: 126/78   BP Location: Right arm   Patient Position: Sitting   Pulse: 70   Resp: 20   Temp: 97.8 °F (36.6 °C)   TempSrc: Oral   SpO2: 98%   Weight: 70.1 kg (154 lb 8.7 oz)   Height: 5' 10" (1.778 m)       Pertinent physical exam:  No acute distress    Brief workup plan:  Labs, meds, imaging    Preliminary workup initiated; this workup will be continued and followed by the physician or advanced practice provider that is assigned to the patient when roomed.  "